# Patient Record
Sex: FEMALE | Race: BLACK OR AFRICAN AMERICAN | Employment: FULL TIME | URBAN - METROPOLITAN AREA
[De-identification: names, ages, dates, MRNs, and addresses within clinical notes are randomized per-mention and may not be internally consistent; named-entity substitution may affect disease eponyms.]

---

## 2017-08-10 ENCOUNTER — HOSPITAL ENCOUNTER (EMERGENCY)
Facility: HOSPITAL | Age: 27
Discharge: HOME | End: 2017-08-10
Attending: EMERGENCY MEDICINE

## 2017-08-10 VITALS
HEART RATE: 87 BPM | WEIGHT: 160 LBS | TEMPERATURE: 98 F | HEIGHT: 61 IN | BODY MASS INDEX: 30.21 KG/M2 | SYSTOLIC BLOOD PRESSURE: 110 MMHG | OXYGEN SATURATION: 98 % | DIASTOLIC BLOOD PRESSURE: 72 MMHG | RESPIRATION RATE: 17 BRPM

## 2017-08-10 DIAGNOSIS — J02.9 ACUTE VIRAL PHARYNGITIS: Primary | ICD-10-CM

## 2017-08-10 PROCEDURE — 87430 STREP A AG IA: CPT | Performed by: EMERGENCY MEDICINE

## 2017-08-10 PROCEDURE — 87081 CULTURE SCREEN ONLY: CPT | Performed by: EMERGENCY MEDICINE

## 2017-08-10 PROCEDURE — 99283 EMERGENCY DEPT VISIT LOW MDM: CPT

## 2017-08-10 NOTE — ED PROVIDER NOTES
Patient Seen in: [facility] Emergency Department    History   Patient presents with:  Sore Throat    Stated Complaint: hoarse    HPI    67-year-old female comes in the hospital to complaint of having difficulty with a sore throat.   It has been slowl 1117]  Pulse: 87 [08/10/17 1114]  Resp: 17 [08/10/17 1114]  Temp: 98.1 °F (36.7 °C) [08/10/17 1114]  Temp src: n/a  SpO2: 98 % [08/10/17 1114]  O2 Device: n/a    Current:/72   Pulse 87   Temp 98.1 °F (36.7 °C)   Resp 17   Ht 154.9 cm (5' 1\")   Wt 72

## 2017-08-11 ENCOUNTER — TELEPHONE (OUTPATIENT)
Dept: FAMILY MEDICINE CLINIC | Facility: CLINIC | Age: 27
End: 2017-08-11

## 2018-04-28 ENCOUNTER — APPOINTMENT (OUTPATIENT)
Dept: CT IMAGING | Facility: HOSPITAL | Age: 28
End: 2018-04-28
Attending: EMERGENCY MEDICINE

## 2018-04-28 ENCOUNTER — HOSPITAL ENCOUNTER (EMERGENCY)
Facility: HOSPITAL | Age: 28
Discharge: HOME | End: 2018-04-28
Attending: EMERGENCY MEDICINE

## 2018-04-28 VITALS
DIASTOLIC BLOOD PRESSURE: 60 MMHG | OXYGEN SATURATION: 100 % | HEIGHT: 62 IN | SYSTOLIC BLOOD PRESSURE: 96 MMHG | HEART RATE: 88 BPM | WEIGHT: 160 LBS | RESPIRATION RATE: 16 BRPM | BODY MASS INDEX: 29.44 KG/M2

## 2018-04-28 DIAGNOSIS — R51.9 HEADACHE IN FRONT OF HEAD: Primary | ICD-10-CM

## 2018-04-28 PROCEDURE — 96374 THER/PROPH/DIAG INJ IV PUSH: CPT

## 2018-04-28 PROCEDURE — 99284 EMERGENCY DEPT VISIT MOD MDM: CPT

## 2018-04-28 PROCEDURE — 81025 URINE PREGNANCY TEST: CPT

## 2018-04-28 PROCEDURE — 96375 TX/PRO/DX INJ NEW DRUG ADDON: CPT

## 2018-04-28 PROCEDURE — 85025 COMPLETE CBC W/AUTO DIFF WBC: CPT | Performed by: EMERGENCY MEDICINE

## 2018-04-28 PROCEDURE — 96361 HYDRATE IV INFUSION ADD-ON: CPT

## 2018-04-28 PROCEDURE — 70450 CT HEAD/BRAIN W/O DYE: CPT | Performed by: EMERGENCY MEDICINE

## 2018-04-28 PROCEDURE — 80048 BASIC METABOLIC PNL TOTAL CA: CPT | Performed by: EMERGENCY MEDICINE

## 2018-04-28 RX ORDER — LORAZEPAM 2 MG/ML
0.5 INJECTION INTRAMUSCULAR ONCE
Status: COMPLETED | OUTPATIENT
Start: 2018-04-28 | End: 2018-04-28

## 2018-04-28 RX ORDER — KETOROLAC TROMETHAMINE 30 MG/ML
30 INJECTION, SOLUTION INTRAMUSCULAR; INTRAVENOUS ONCE
Status: COMPLETED | OUTPATIENT
Start: 2018-04-28 | End: 2018-04-28

## 2018-04-28 RX ORDER — METOCLOPRAMIDE HYDROCHLORIDE 5 MG/ML
10 INJECTION INTRAMUSCULAR; INTRAVENOUS ONCE
Status: COMPLETED | OUTPATIENT
Start: 2018-04-28 | End: 2018-04-28

## 2018-04-28 RX ORDER — KETOROLAC TROMETHAMINE 10 MG/1
10 TABLET, FILM COATED ORAL EVERY 6 HOURS PRN
Qty: 20 TABLET | Refills: 0 | Status: SHIPPED | OUTPATIENT
Start: 2018-04-28 | End: 2018-05-05

## 2018-04-28 RX ORDER — METOCLOPRAMIDE 10 MG/1
10 TABLET ORAL 3 TIMES DAILY PRN
Qty: 20 TABLET | Refills: 0 | Status: SHIPPED | OUTPATIENT
Start: 2018-04-28 | End: 2018-05-28

## 2018-04-28 NOTE — ED PROVIDER NOTES
Patient Seen in: [facility] Emergency Department    History   Patient presents with:  Headache (neurologic)  Dyspnea SUJATHA SOB (respiratory)    Stated Complaint: Headache, SUJATHA    HPI    Patient complains of headache that started about 1 hour ago.   Tian Bhakta patient is awake, alert, conversant. Patient answers questions quickly and appropriately. She is hyperventilating and appears anxious. Face is symmetric. Speech is clear.   Eyes: sclera white, conjunctiva pink and moist midrange pupils, equal round reacti the individual orders.    POCT PREGNANCY, URINE   RAINBOW DRAW BLUE   RAINBOW DRAW LAVENDER   RAINBOW DRAW LIGHT GREEN   RAINBOW DRAW GOLD       ED Course as of Apr 28 1915  ------------------------------------------------------------       MDM   Patient's 0

## 2018-10-17 ENCOUNTER — APPOINTMENT (OUTPATIENT)
Dept: ULTRASOUND IMAGING | Facility: HOSPITAL | Age: 28
End: 2018-10-17
Attending: EMERGENCY MEDICINE

## 2018-10-17 ENCOUNTER — HOSPITAL ENCOUNTER (EMERGENCY)
Facility: HOSPITAL | Age: 28
Discharge: HOME | End: 2018-10-17
Attending: EMERGENCY MEDICINE

## 2018-10-17 VITALS
RESPIRATION RATE: 14 BRPM | DIASTOLIC BLOOD PRESSURE: 64 MMHG | OXYGEN SATURATION: 100 % | SYSTOLIC BLOOD PRESSURE: 101 MMHG | TEMPERATURE: 99 F | HEART RATE: 72 BPM

## 2018-10-17 DIAGNOSIS — B96.89 BV (BACTERIAL VAGINOSIS): Primary | ICD-10-CM

## 2018-10-17 DIAGNOSIS — N76.0 BV (BACTERIAL VAGINOSIS): Primary | ICD-10-CM

## 2018-10-17 PROCEDURE — 81003 URINALYSIS AUTO W/O SCOPE: CPT | Performed by: EMERGENCY MEDICINE

## 2018-10-17 PROCEDURE — 87491 CHLMYD TRACH DNA AMP PROBE: CPT | Performed by: NURSE PRACTITIONER

## 2018-10-17 PROCEDURE — 87591 N.GONORRHOEAE DNA AMP PROB: CPT | Performed by: NURSE PRACTITIONER

## 2018-10-17 PROCEDURE — 87510 GARDNER VAG DNA DIR PROBE: CPT | Performed by: NURSE PRACTITIONER

## 2018-10-17 PROCEDURE — 87660 TRICHOMONAS VAGIN DIR PROBE: CPT | Performed by: NURSE PRACTITIONER

## 2018-10-17 PROCEDURE — 87480 CANDIDA DNA DIR PROBE: CPT | Performed by: NURSE PRACTITIONER

## 2018-10-17 PROCEDURE — 81003 URINALYSIS AUTO W/O SCOPE: CPT

## 2018-10-17 PROCEDURE — 99285 EMERGENCY DEPT VISIT HI MDM: CPT

## 2018-10-17 PROCEDURE — 99284 EMERGENCY DEPT VISIT MOD MDM: CPT

## 2018-10-17 PROCEDURE — 76856 US EXAM PELVIC COMPLETE: CPT | Performed by: EMERGENCY MEDICINE

## 2018-10-17 PROCEDURE — 93975 VASCULAR STUDY: CPT | Performed by: EMERGENCY MEDICINE

## 2018-10-17 PROCEDURE — 81025 URINE PREGNANCY TEST: CPT

## 2018-10-17 PROCEDURE — 76830 TRANSVAGINAL US NON-OB: CPT | Performed by: EMERGENCY MEDICINE

## 2018-10-17 RX ORDER — METRONIDAZOLE 500 MG/1
500 TABLET ORAL 2 TIMES DAILY
Qty: 14 TABLET | Refills: 0 | Status: SHIPPED | OUTPATIENT
Start: 2018-10-17 | End: 2018-10-24

## 2018-10-17 RX ORDER — IBUPROFEN 600 MG/1
600 TABLET ORAL ONCE
Status: COMPLETED | OUTPATIENT
Start: 2018-10-17 | End: 2018-10-17

## 2018-10-17 NOTE — ED PROVIDER NOTES
Patient Seen in: [facility] Emergency Department    History   Patient presents with:  Abdomen/Flank Pain (GI/)    Stated Complaint: abd pain    HPI  Patient is a 60-year-old female who presents with 2-week history of intermittent left lower [place] Constitutional: She is oriented to person, place, and time. She appears well-developed and well-nourished. Non-toxic appearance. She does not appear ill. No distress.    HENT:   Mouth/Throat: Oropharynx is clear and moist.   Eyes: Conjunctivae are normal the pelvis was performed with a transvaginal and transabdominal probe. Doppler evaluation of the ovaries was performed of the ovarian arteries and veins. B-mode images, Doppler color flow, and spectral waveform analysis were performed.   Transvaginal ultr Dr. Bryce Lowery    Disposition and Plan     Clinical Impression:  BV (bacterial vaginosis)  (primary encounter diagnosis)    Disposition:  Discharge  10/17/2018  9:34 pm    Follow-up:  Alcira Bliss MD  [facility] [address] [facility] [address] [place] [id number]  [phone]

## 2019-03-24 ENCOUNTER — APPOINTMENT (OUTPATIENT)
Dept: ULTRASOUND IMAGING | Facility: HOSPITAL | Age: 29
End: 2019-03-24
Attending: EMERGENCY MEDICINE
Payer: MEDICAID

## 2019-03-24 ENCOUNTER — HOSPITAL ENCOUNTER (EMERGENCY)
Facility: HOSPITAL | Age: 29
Discharge: HOME | End: 2019-03-24
Attending: EMERGENCY MEDICINE
Payer: MEDICAID

## 2019-03-24 VITALS
TEMPERATURE: 99 F | DIASTOLIC BLOOD PRESSURE: 54 MMHG | BODY MASS INDEX: 29.06 KG/M2 | RESPIRATION RATE: 16 BRPM | WEIGHT: 148 LBS | HEIGHT: 60 IN | SYSTOLIC BLOOD PRESSURE: 88 MMHG | OXYGEN SATURATION: 100 % | HEART RATE: 86 BPM

## 2019-03-24 DIAGNOSIS — Z34.91 FIRST TRIMESTER PREGNANCY: Primary | ICD-10-CM

## 2019-03-24 DIAGNOSIS — B96.89 BACTERIAL VAGINOSIS: ICD-10-CM

## 2019-03-24 DIAGNOSIS — N76.0 BACTERIAL VAGINOSIS: ICD-10-CM

## 2019-03-24 LAB
ALBUMIN SERPL-MCNC: 3.3 G/DL (ref 3.4–5)
ALBUMIN/GLOB SERPL: 0.9 {RATIO} (ref 1–2)
ALP LIVER SERPL-CCNC: 58 U/L (ref 37–98)
ALT SERPL-CCNC: 15 U/L (ref 13–56)
ANION GAP SERPL CALC-SCNC: 6 MMOL/L (ref 0–18)
AST SERPL-CCNC: 13 U/L (ref 15–37)
B-HCG SERPL-ACNC: ABNORMAL MIU/ML
BASOPHILS # BLD AUTO: 0.04 X10(3) UL (ref 0–0.2)
BASOPHILS NFR BLD AUTO: 0.6 %
BILIRUB SERPL-MCNC: 0.3 MG/DL (ref 0.1–2)
BILIRUB UR QL STRIP.AUTO: NEGATIVE
BUN BLD-MCNC: 7 MG/DL (ref 7–18)
BUN/CREAT SERPL: 8.4 (ref 10–20)
CALCIUM BLD-MCNC: 8.1 MG/DL (ref 8.5–10.1)
CHLORIDE SERPL-SCNC: 108 MMOL/L (ref 98–107)
CLARITY UR REFRACT.AUTO: CLEAR
CO2 SERPL-SCNC: 23 MMOL/L (ref 21–32)
COLOR UR AUTO: YELLOW
CREAT BLD-MCNC: 0.83 MG/DL (ref 0.55–1.02)
DEPRECATED RDW RBC AUTO: 45.4 FL (ref 35.1–46.3)
EOSINOPHIL # BLD AUTO: 0.1 X10(3) UL (ref 0–0.7)
EOSINOPHIL NFR BLD AUTO: 1.5 %
ERYTHROCYTE [DISTWIDTH] IN BLOOD BY AUTOMATED COUNT: 13.2 % (ref 11–15)
GLOBULIN PLAS-MCNC: 3.6 G/DL (ref 2.8–4.4)
GLUCOSE BLD-MCNC: 102 MG/DL (ref 70–99)
GLUCOSE UR STRIP.AUTO-MCNC: NEGATIVE MG/DL
HCT VFR BLD AUTO: 39.2 % (ref 35–48)
HGB BLD-MCNC: 13.4 G/DL (ref 12–16)
IMM GRANULOCYTES # BLD AUTO: 0.02 X10(3) UL (ref 0–1)
IMM GRANULOCYTES NFR BLD: 0.3 %
KETONES UR STRIP.AUTO-MCNC: NEGATIVE MG/DL
LEUKOCYTE ESTERASE UR QL STRIP.AUTO: NEGATIVE
LIPASE SERPL-CCNC: 135 U/L (ref 73–393)
LYMPHOCYTES # BLD AUTO: 1.33 X10(3) UL (ref 1–4)
LYMPHOCYTES NFR BLD AUTO: 20.6 %
M PROTEIN MFR SERPL ELPH: 6.9 G/DL (ref 6.4–8.2)
MCH RBC QN AUTO: 32.1 PG (ref 26–34)
MCHC RBC AUTO-ENTMCNC: 34.2 G/DL (ref 31–37)
MCV RBC AUTO: 94 FL (ref 80–100)
MONOCYTES # BLD AUTO: 0.37 X10(3) UL (ref 0.1–1)
MONOCYTES NFR BLD AUTO: 5.7 %
NEUTROPHILS # BLD AUTO: 4.61 X10 (3) UL (ref 1.5–7.7)
NEUTROPHILS # BLD AUTO: 4.61 X10(3) UL (ref 1.5–7.7)
NEUTROPHILS NFR BLD AUTO: 71.3 %
NITRITE UR QL STRIP.AUTO: NEGATIVE
OSMOLALITY SERPL CALC.SUM OF ELEC: 282 MOSM/KG (ref 275–295)
PH UR STRIP.AUTO: 6 [PH] (ref 4.5–8)
PLATELET # BLD AUTO: 220 10(3)UL (ref 150–450)
POCT LOT NUMBER: NORMAL
POCT URINE PREGNANCY: POSITIVE
POTASSIUM SERPL-SCNC: 3.6 MMOL/L (ref 3.5–5.1)
PROT UR STRIP.AUTO-MCNC: NEGATIVE MG/DL
RBC # BLD AUTO: 4.17 X10(6)UL (ref 3.8–5.3)
RBC UR QL AUTO: NEGATIVE
SODIUM SERPL-SCNC: 137 MMOL/L (ref 136–145)
SP GR UR STRIP.AUTO: 1.01 (ref 1–1.03)
UROBILINOGEN UR STRIP.AUTO-MCNC: <2 MG/DL
WBC # BLD AUTO: 6.5 X10(3) UL (ref 4–11)

## 2019-03-24 PROCEDURE — 87491 CHLMYD TRACH DNA AMP PROBE: CPT | Performed by: EMERGENCY MEDICINE

## 2019-03-24 PROCEDURE — 96361 HYDRATE IV INFUSION ADD-ON: CPT

## 2019-03-24 PROCEDURE — 76817 TRANSVAGINAL US OBSTETRIC: CPT | Performed by: EMERGENCY MEDICINE

## 2019-03-24 PROCEDURE — 84702 CHORIONIC GONADOTROPIN TEST: CPT | Performed by: EMERGENCY MEDICINE

## 2019-03-24 PROCEDURE — 87480 CANDIDA DNA DIR PROBE: CPT | Performed by: EMERGENCY MEDICINE

## 2019-03-24 PROCEDURE — 87591 N.GONORRHOEAE DNA AMP PROB: CPT | Performed by: EMERGENCY MEDICINE

## 2019-03-24 PROCEDURE — 85025 COMPLETE CBC W/AUTO DIFF WBC: CPT | Performed by: EMERGENCY MEDICINE

## 2019-03-24 PROCEDURE — 99285 EMERGENCY DEPT VISIT HI MDM: CPT

## 2019-03-24 PROCEDURE — 87510 GARDNER VAG DNA DIR PROBE: CPT | Performed by: EMERGENCY MEDICINE

## 2019-03-24 PROCEDURE — 81025 URINE PREGNANCY TEST: CPT

## 2019-03-24 PROCEDURE — 80053 COMPREHEN METABOLIC PANEL: CPT | Performed by: EMERGENCY MEDICINE

## 2019-03-24 PROCEDURE — 83690 ASSAY OF LIPASE: CPT | Performed by: EMERGENCY MEDICINE

## 2019-03-24 PROCEDURE — 87660 TRICHOMONAS VAGIN DIR PROBE: CPT | Performed by: EMERGENCY MEDICINE

## 2019-03-24 PROCEDURE — 96360 HYDRATION IV INFUSION INIT: CPT

## 2019-03-24 PROCEDURE — 99284 EMERGENCY DEPT VISIT MOD MDM: CPT

## 2019-03-24 PROCEDURE — 81003 URINALYSIS AUTO W/O SCOPE: CPT | Performed by: EMERGENCY MEDICINE

## 2019-03-24 PROCEDURE — 76801 OB US < 14 WKS SINGLE FETUS: CPT | Performed by: EMERGENCY MEDICINE

## 2019-03-24 RX ORDER — METRONIDAZOLE 500 MG/1
500 TABLET ORAL 2 TIMES DAILY
Qty: 14 TABLET | Refills: 0 | Status: SHIPPED | OUTPATIENT
Start: 2019-03-24 | End: 2019-03-31

## 2019-03-24 NOTE — ED NOTES
Urine obtained and sent, ucg positive, md aware, awaiting bladder to be full for us, no chnages, no distress noted

## 2019-03-24 NOTE — ED INITIAL ASSESSMENT (HPI)
Pt presents with bilat lower abdominal pain starting on the r 2 days ago, pt c/o vaginal discharge and urinary urgency, pt concerned she may be pregnant

## 2019-03-24 NOTE — ED PROVIDER NOTES
Patient Seen in: [facility] Emergency Department    History   Patient presents with:  Abdomen/Flank Pain (GI/)    Stated Complaint: abdominal pain x 2 days    HPI    Patient is a 79-year-old female, presenting for evaluation of pelvic pain.     Sym HEART: Regular rate and rhythm. ABDOMEN: The abdomen is soft, nondistended, nontender. Bowel sounds present. SKIN: Skin is warm and dry. There are no rashes. EXTREMITIES: The patient moves all 4 extremities freely. No cyanosis, clubbing, or edema. Us Ob W/ Ev 1st Trimester (NTZ=31800/22431)    Result Date: 3/24/2019  PROCEDURE:  US OB W/ EV 1ST TRIMESTER (CPT=76801/76328)  COMPARISON:  None.   INDICATIONS:  eval pregnancy  TECHNIQUE:  Transabdominal and endovaginal pelvic ultrasound examinations we Outpatient follow-up emphasized. Additional evaluation and intervention may be required, depending on the patient's clinical course. Patient should arrange follow-up as soon as possible.   Patient was invited to return for any problems, worsening symp

## 2019-03-25 LAB
C TRACH DNA SPEC QL NAA+PROBE: NEGATIVE
N GONORRHOEA DNA SPEC QL NAA+PROBE: NEGATIVE

## 2019-06-18 ENCOUNTER — HOSPITAL ENCOUNTER (EMERGENCY)
Facility: HOSPITAL | Age: 29
Discharge: HOME | End: 2019-06-18
Attending: EMERGENCY MEDICINE
Payer: MEDICAID

## 2019-06-18 ENCOUNTER — APPOINTMENT (OUTPATIENT)
Dept: CT IMAGING | Facility: HOSPITAL | Age: 29
End: 2019-06-18
Attending: EMERGENCY MEDICINE
Payer: MEDICAID

## 2019-06-18 VITALS
HEART RATE: 78 BPM | BODY MASS INDEX: 27.38 KG/M2 | RESPIRATION RATE: 16 BRPM | HEIGHT: 61 IN | OXYGEN SATURATION: 100 % | SYSTOLIC BLOOD PRESSURE: 100 MMHG | DIASTOLIC BLOOD PRESSURE: 61 MMHG | WEIGHT: 145 LBS | TEMPERATURE: 99 F

## 2019-06-18 DIAGNOSIS — B96.89 BACTERIAL VAGINOSIS: ICD-10-CM

## 2019-06-18 DIAGNOSIS — N20.1 URETEROLITHIASIS: Primary | ICD-10-CM

## 2019-06-18 DIAGNOSIS — E87.6 HYPOKALEMIA: ICD-10-CM

## 2019-06-18 DIAGNOSIS — N76.0 BACTERIAL VAGINOSIS: ICD-10-CM

## 2019-06-18 PROCEDURE — 87491 CHLMYD TRACH DNA AMP PROBE: CPT | Performed by: EMERGENCY MEDICINE

## 2019-06-18 PROCEDURE — 81025 URINE PREGNANCY TEST: CPT

## 2019-06-18 PROCEDURE — 85025 COMPLETE CBC W/AUTO DIFF WBC: CPT | Performed by: EMERGENCY MEDICINE

## 2019-06-18 PROCEDURE — 99284 EMERGENCY DEPT VISIT MOD MDM: CPT

## 2019-06-18 PROCEDURE — 87086 URINE CULTURE/COLONY COUNT: CPT | Performed by: EMERGENCY MEDICINE

## 2019-06-18 PROCEDURE — 87510 GARDNER VAG DNA DIR PROBE: CPT | Performed by: EMERGENCY MEDICINE

## 2019-06-18 PROCEDURE — 87591 N.GONORRHOEAE DNA AMP PROB: CPT | Performed by: EMERGENCY MEDICINE

## 2019-06-18 PROCEDURE — 83690 ASSAY OF LIPASE: CPT | Performed by: EMERGENCY MEDICINE

## 2019-06-18 PROCEDURE — 96374 THER/PROPH/DIAG INJ IV PUSH: CPT

## 2019-06-18 PROCEDURE — 74176 CT ABD & PELVIS W/O CONTRAST: CPT | Performed by: EMERGENCY MEDICINE

## 2019-06-18 PROCEDURE — 80053 COMPREHEN METABOLIC PANEL: CPT | Performed by: EMERGENCY MEDICINE

## 2019-06-18 PROCEDURE — 87660 TRICHOMONAS VAGIN DIR PROBE: CPT | Performed by: EMERGENCY MEDICINE

## 2019-06-18 PROCEDURE — 81001 URINALYSIS AUTO W/SCOPE: CPT | Performed by: EMERGENCY MEDICINE

## 2019-06-18 PROCEDURE — 87480 CANDIDA DNA DIR PROBE: CPT | Performed by: EMERGENCY MEDICINE

## 2019-06-18 RX ORDER — CLINDAMYCIN HYDROCHLORIDE 300 MG/1
300 CAPSULE ORAL 2 TIMES DAILY
Qty: 14 CAPSULE | Refills: 0 | Status: SHIPPED | OUTPATIENT
Start: 2019-06-18 | End: 2019-06-25

## 2019-06-18 RX ORDER — KETOROLAC TROMETHAMINE 30 MG/ML
30 INJECTION, SOLUTION INTRAMUSCULAR; INTRAVENOUS ONCE
Status: COMPLETED | OUTPATIENT
Start: 2019-06-18 | End: 2019-06-18

## 2019-06-18 RX ORDER — POTASSIUM CHLORIDE 20 MEQ/1
60 TABLET, EXTENDED RELEASE ORAL ONCE
Status: COMPLETED | OUTPATIENT
Start: 2019-06-18 | End: 2019-06-18

## 2019-06-18 RX ORDER — HYDROCODONE BITARTRATE AND ACETAMINOPHEN 5; 325 MG/1; MG/1
1-2 TABLET ORAL EVERY 6 HOURS PRN
Qty: 10 TABLET | Refills: 0 | Status: SHIPPED | OUTPATIENT
Start: 2019-06-18 | End: 2019-06-25

## 2019-06-18 RX ORDER — ONDANSETRON 4 MG/1
4 TABLET, ORALLY DISINTEGRATING ORAL EVERY 4 HOURS PRN
Qty: 10 TABLET | Refills: 0 | Status: SHIPPED | OUTPATIENT
Start: 2019-06-18 | End: 2019-06-25

## 2019-06-18 NOTE — ED INITIAL ASSESSMENT (HPI)
Pt here with L hip area pain, burning pain that radiates around to L groin area. Pain started yesterday. States she had  in April and also had IUD placed at that time and has not had follow up with gyn yet.   She is not sure if this is related to

## 2019-06-18 NOTE — ED PROVIDER NOTES
Patient Seen in: [facility] Emergency Department    History   Patient presents with:  Abdomen/Flank Pain (GI/)    Stated Complaint: left flank pain since yesterday. Had  one month ago. HPI    Patient presents with left side pain.   The 100%   BMI 27.40 kg/m²         Physical Exam    General: Alert and oriented x3 in no acute distress. HEENT: Normocephalic, atraumatic, pupils equal round and reactive to light, oropharynx clear. Neck: Supple.   Cardiovascular: Regular rate and rhythm, no ------                     CBC W/ DIFFERENTIAL[[id number]]          Abnormal            Final result                 Please view results for these tests on the individual orders.    POCT PREGNANCY, URINE   RAINBOW DRAW BLUE   RAINBOW DRAW LAVENDER   RAINBOW Negative. CONCLUSION:   1. There is a 4 mm calculus in the mid left ureter causing mild obstruction. 2. Marked medullary nephrocalcinosis is noted. 3. IUD is low lying in the lower uterine segment. This does not reach the gastric fundus.     Dictat mouth 2 (two) times daily for 7 days. , Print Script, Disp-14 capsule, R-0

## 2019-06-19 PROBLEM — N29 NEPHROCALCINOSIS: Status: ACTIVE | Noted: 2019-06-19

## 2019-06-19 PROBLEM — E83.59 NEPHROCALCINOSIS: Status: ACTIVE | Noted: 2019-06-19

## 2019-06-19 PROBLEM — Q61.5 MEDULLARY SPONGE KIDNEY OF BOTH KIDNEYS: Status: ACTIVE | Noted: 2019-06-19

## 2019-06-19 PROBLEM — R10.9 LEFT FLANK PAIN: Status: ACTIVE | Noted: 2019-06-19

## 2019-06-19 PROBLEM — N20.1 LEFT URETERAL STONE: Status: ACTIVE | Noted: 2019-06-19

## 2019-06-21 ENCOUNTER — APPOINTMENT (OUTPATIENT)
Dept: GENERAL RADIOLOGY | Facility: HOSPITAL | Age: 29
End: 2019-06-21
Attending: EMERGENCY MEDICINE
Payer: MEDICAID

## 2019-06-21 ENCOUNTER — HOSPITAL ENCOUNTER (EMERGENCY)
Facility: HOSPITAL | Age: 29
Discharge: HOME | End: 2019-06-21
Attending: EMERGENCY MEDICINE
Payer: MEDICAID

## 2019-06-21 VITALS
OXYGEN SATURATION: 100 % | RESPIRATION RATE: 20 BRPM | TEMPERATURE: 98 F | SYSTOLIC BLOOD PRESSURE: 101 MMHG | WEIGHT: 145 LBS | HEART RATE: 68 BPM | HEIGHT: 61 IN | BODY MASS INDEX: 27.38 KG/M2 | DIASTOLIC BLOOD PRESSURE: 64 MMHG

## 2019-06-21 DIAGNOSIS — N39.0 URINARY TRACT INFECTION WITHOUT HEMATURIA, SITE UNSPECIFIED: ICD-10-CM

## 2019-06-21 DIAGNOSIS — N20.0 KIDNEY STONE: Primary | ICD-10-CM

## 2019-06-21 PROCEDURE — 74018 RADEX ABDOMEN 1 VIEW: CPT | Performed by: EMERGENCY MEDICINE

## 2019-06-21 PROCEDURE — 99284 EMERGENCY DEPT VISIT MOD MDM: CPT

## 2019-06-21 PROCEDURE — 36415 COLL VENOUS BLD VENIPUNCTURE: CPT

## 2019-06-21 PROCEDURE — 87086 URINE CULTURE/COLONY COUNT: CPT | Performed by: EMERGENCY MEDICINE

## 2019-06-21 PROCEDURE — 80048 BASIC METABOLIC PNL TOTAL CA: CPT | Performed by: EMERGENCY MEDICINE

## 2019-06-21 PROCEDURE — 85025 COMPLETE CBC W/AUTO DIFF WBC: CPT | Performed by: EMERGENCY MEDICINE

## 2019-06-21 PROCEDURE — 81001 URINALYSIS AUTO W/SCOPE: CPT | Performed by: EMERGENCY MEDICINE

## 2019-06-21 RX ORDER — SULFAMETHOXAZOLE AND TRIMETHOPRIM 800; 160 MG/1; MG/1
1 TABLET ORAL 2 TIMES DAILY
Qty: 14 TABLET | Refills: 0 | Status: SHIPPED | OUTPATIENT
Start: 2019-06-21 | End: 2019-06-28

## 2019-06-21 NOTE — ED PROVIDER NOTES
Patient Seen in: [facility] Emergency Department    History   Patient presents with:  Abdomen/Flank Pain (GI/)    Stated Complaint: recent dx of kidney stone- continued pain    HPI    16-year-old woman who was diagnosed with a kidney stone 3 days URINALYSIS WITH CULTURE REFLEX - Abnormal; Notable for the following components:       Result Value    Leukocyte Esterase Urine Trace (*)     WBC Urine 11-20 (*)     RBC URINE 3-5 (*)     Bacteria Urine Rare (*)     Squamous Epi.  Cells Moderate (*)     M unspecified    Disposition:  Discharge  6/21/2019 11:01 am    Follow-up:  Romelia Hartman MD  [facility] 24, [address]  [phone]    In 3 days          Medications Prescribed:  Discharge Medication List as of 6/21/2019 11:0

## 2019-06-21 NOTE — ED INITIAL ASSESSMENT (HPI)
Pt presented to ED c/o worsening left side flank pain. Pt was here Tuesday and was diagnosed with Kidney stones. Pt was told to return if pain worsened prompting pt visit today.

## 2019-07-12 ENCOUNTER — APPOINTMENT (OUTPATIENT)
Dept: ULTRASOUND IMAGING | Facility: HOSPITAL | Age: 29
End: 2019-07-12
Attending: STUDENT IN AN ORGANIZED HEALTH CARE EDUCATION/TRAINING PROGRAM
Payer: MEDICAID

## 2019-07-12 ENCOUNTER — HOSPITAL ENCOUNTER (EMERGENCY)
Facility: HOSPITAL | Age: 29
Discharge: HOME | End: 2019-07-12
Attending: STUDENT IN AN ORGANIZED HEALTH CARE EDUCATION/TRAINING PROGRAM
Payer: MEDICAID

## 2019-07-12 VITALS
HEART RATE: 68 BPM | DIASTOLIC BLOOD PRESSURE: 52 MMHG | BODY MASS INDEX: 27.38 KG/M2 | TEMPERATURE: 99 F | WEIGHT: 145 LBS | RESPIRATION RATE: 16 BRPM | HEIGHT: 61 IN | SYSTOLIC BLOOD PRESSURE: 100 MMHG | OXYGEN SATURATION: 98 %

## 2019-07-12 DIAGNOSIS — N39.0 URINARY TRACT INFECTION WITHOUT HEMATURIA, SITE UNSPECIFIED: ICD-10-CM

## 2019-07-12 DIAGNOSIS — N23 RENAL COLIC: Primary | ICD-10-CM

## 2019-07-12 LAB
ALBUMIN SERPL-MCNC: 3.4 G/DL (ref 3.4–5)
ALBUMIN/GLOB SERPL: 0.9 {RATIO} (ref 1–2)
ALP LIVER SERPL-CCNC: 68 U/L (ref 37–98)
ALT SERPL-CCNC: 23 U/L (ref 13–56)
ANION GAP SERPL CALC-SCNC: 5 MMOL/L (ref 0–18)
AST SERPL-CCNC: 13 U/L (ref 15–37)
BASOPHILS # BLD AUTO: 0.04 X10(3) UL (ref 0–0.2)
BASOPHILS NFR BLD AUTO: 0.6 %
BILIRUB SERPL-MCNC: 0.3 MG/DL (ref 0.1–2)
BILIRUB UR QL STRIP.AUTO: NEGATIVE
BUN BLD-MCNC: 6 MG/DL (ref 7–18)
BUN/CREAT SERPL: 7.7 (ref 10–20)
CALCIUM BLD-MCNC: 8.7 MG/DL (ref 8.5–10.1)
CHLORIDE SERPL-SCNC: 108 MMOL/L (ref 98–112)
CO2 SERPL-SCNC: 26 MMOL/L (ref 21–32)
COLOR UR AUTO: YELLOW
CREAT BLD-MCNC: 0.78 MG/DL (ref 0.55–1.02)
DEPRECATED RDW RBC AUTO: 44.5 FL (ref 35.1–46.3)
EOSINOPHIL # BLD AUTO: 0.11 X10(3) UL (ref 0–0.7)
EOSINOPHIL NFR BLD AUTO: 1.7 %
ERYTHROCYTE [DISTWIDTH] IN BLOOD BY AUTOMATED COUNT: 13 % (ref 11–15)
GLOBULIN PLAS-MCNC: 4 G/DL (ref 2.8–4.4)
GLUCOSE BLD-MCNC: 88 MG/DL (ref 70–99)
GLUCOSE UR STRIP.AUTO-MCNC: NEGATIVE MG/DL
HCT VFR BLD AUTO: 38.3 % (ref 35–48)
HGB BLD-MCNC: 12.8 G/DL (ref 12–16)
IMM GRANULOCYTES # BLD AUTO: 0.01 X10(3) UL (ref 0–1)
IMM GRANULOCYTES NFR BLD: 0.2 %
KETONES UR STRIP.AUTO-MCNC: NEGATIVE MG/DL
LYMPHOCYTES # BLD AUTO: 2.11 X10(3) UL (ref 1–4)
LYMPHOCYTES NFR BLD AUTO: 32.5 %
M PROTEIN MFR SERPL ELPH: 7.4 G/DL (ref 6.4–8.2)
MCH RBC QN AUTO: 31.3 PG (ref 26–34)
MCHC RBC AUTO-ENTMCNC: 33.4 G/DL (ref 31–37)
MCV RBC AUTO: 93.6 FL (ref 80–100)
MONOCYTES # BLD AUTO: 0.47 X10(3) UL (ref 0.1–1)
MONOCYTES NFR BLD AUTO: 7.2 %
NEUTROPHILS # BLD AUTO: 3.76 X10 (3) UL (ref 1.5–7.7)
NEUTROPHILS # BLD AUTO: 3.76 X10(3) UL (ref 1.5–7.7)
NEUTROPHILS NFR BLD AUTO: 57.8 %
NITRITE UR QL STRIP.AUTO: NEGATIVE
OSMOLALITY SERPL CALC.SUM OF ELEC: 285 MOSM/KG (ref 275–295)
PH UR STRIP.AUTO: 6 [PH] (ref 4.5–8)
PLATELET # BLD AUTO: 280 10(3)UL (ref 150–450)
POCT LOT NUMBER: NORMAL
POCT URINE PREGNANCY: NEGATIVE
POTASSIUM SERPL-SCNC: 3.9 MMOL/L (ref 3.5–5.1)
PROT UR STRIP.AUTO-MCNC: NEGATIVE MG/DL
RBC # BLD AUTO: 4.09 X10(6)UL (ref 3.8–5.3)
SODIUM SERPL-SCNC: 139 MMOL/L (ref 136–145)
SP GR UR STRIP.AUTO: 1.01 (ref 1–1.03)
UROBILINOGEN UR STRIP.AUTO-MCNC: <2 MG/DL
WBC # BLD AUTO: 6.5 X10(3) UL (ref 4–11)
WBC #/AREA URNS AUTO: >50 /HPF

## 2019-07-12 PROCEDURE — 96365 THER/PROPH/DIAG IV INF INIT: CPT | Performed by: STUDENT IN AN ORGANIZED HEALTH CARE EDUCATION/TRAINING PROGRAM

## 2019-07-12 PROCEDURE — 87186 SC STD MICRODIL/AGAR DIL: CPT | Performed by: STUDENT IN AN ORGANIZED HEALTH CARE EDUCATION/TRAINING PROGRAM

## 2019-07-12 PROCEDURE — 96375 TX/PRO/DX INJ NEW DRUG ADDON: CPT | Performed by: STUDENT IN AN ORGANIZED HEALTH CARE EDUCATION/TRAINING PROGRAM

## 2019-07-12 PROCEDURE — 87088 URINE BACTERIA CULTURE: CPT | Performed by: STUDENT IN AN ORGANIZED HEALTH CARE EDUCATION/TRAINING PROGRAM

## 2019-07-12 PROCEDURE — 85025 COMPLETE CBC W/AUTO DIFF WBC: CPT | Performed by: STUDENT IN AN ORGANIZED HEALTH CARE EDUCATION/TRAINING PROGRAM

## 2019-07-12 PROCEDURE — 76770 US EXAM ABDO BACK WALL COMP: CPT | Performed by: STUDENT IN AN ORGANIZED HEALTH CARE EDUCATION/TRAINING PROGRAM

## 2019-07-12 PROCEDURE — 81001 URINALYSIS AUTO W/SCOPE: CPT | Performed by: STUDENT IN AN ORGANIZED HEALTH CARE EDUCATION/TRAINING PROGRAM

## 2019-07-12 PROCEDURE — 96361 HYDRATE IV INFUSION ADD-ON: CPT | Performed by: STUDENT IN AN ORGANIZED HEALTH CARE EDUCATION/TRAINING PROGRAM

## 2019-07-12 PROCEDURE — 99284 EMERGENCY DEPT VISIT MOD MDM: CPT | Performed by: STUDENT IN AN ORGANIZED HEALTH CARE EDUCATION/TRAINING PROGRAM

## 2019-07-12 PROCEDURE — 87086 URINE CULTURE/COLONY COUNT: CPT | Performed by: STUDENT IN AN ORGANIZED HEALTH CARE EDUCATION/TRAINING PROGRAM

## 2019-07-12 PROCEDURE — 80053 COMPREHEN METABOLIC PANEL: CPT | Performed by: STUDENT IN AN ORGANIZED HEALTH CARE EDUCATION/TRAINING PROGRAM

## 2019-07-12 PROCEDURE — 81025 URINE PREGNANCY TEST: CPT | Performed by: STUDENT IN AN ORGANIZED HEALTH CARE EDUCATION/TRAINING PROGRAM

## 2019-07-12 RX ORDER — KETOROLAC TROMETHAMINE 30 MG/ML
15 INJECTION, SOLUTION INTRAMUSCULAR; INTRAVENOUS ONCE
Status: COMPLETED | OUTPATIENT
Start: 2019-07-12 | End: 2019-07-12

## 2019-07-12 RX ORDER — CEPHALEXIN 500 MG/1
500 CAPSULE ORAL 4 TIMES DAILY
Qty: 28 CAPSULE | Refills: 0 | Status: SHIPPED | OUTPATIENT
Start: 2019-07-12 | End: 2019-07-19

## 2019-07-12 RX ORDER — KETOROLAC TROMETHAMINE 10 MG/1
10 TABLET, FILM COATED ORAL EVERY 6 HOURS PRN
Qty: 15 TABLET | Refills: 0 | Status: SHIPPED | OUTPATIENT
Start: 2019-07-12 | End: 2019-07-19

## 2019-07-12 NOTE — ED PROVIDER NOTES
Patient Seen in: [facility] Emergency Department    History   Patient presents with:  Urinary Symptoms (urologic)    Stated Complaint:     HPI    Patient is a 31-year-old female who presents the emergency department reporting left-sided flank pain. light.   Neck: Normal range of motion. Neck supple. Cardiovascular: Normal rate, regular rhythm, normal heart sounds and intact distal pulses. Exam reveals no gallop and no friction rub. No murmur heard.   Pulmonary/Chest: Effort normal. No respirator more than 50 white blood cells in the patient's urine with moderate leukocyte esterase, small blood, 3-5 red cells and 2+ bacteria. This is likely related to urinary tract infection. Patient treated with a dose of Rocephin.   Patient's blood work reveals [facility].

## 2019-07-12 NOTE — ED INITIAL ASSESSMENT (HPI)
Pt here with L flank pain and urinary urgency, pt was dx'd with kidney stone on the L side but did not follow up with Urology due to work schedule.

## 2019-07-15 ENCOUNTER — OFFICE VISIT (OUTPATIENT)
Dept: FAMILY MEDICINE CLINIC | Facility: CLINIC | Age: 29
End: 2019-07-15
Payer: MEDICAID

## 2019-07-15 VITALS
BODY MASS INDEX: 27.94 KG/M2 | HEART RATE: 98 BPM | OXYGEN SATURATION: 99 % | HEIGHT: 61 IN | SYSTOLIC BLOOD PRESSURE: 118 MMHG | RESPIRATION RATE: 16 BRPM | DIASTOLIC BLOOD PRESSURE: 60 MMHG | WEIGHT: 148 LBS | TEMPERATURE: 99 F

## 2019-07-15 DIAGNOSIS — T36.95XA ANTIBIOTIC-INDUCED YEAST INFECTION: Primary | ICD-10-CM

## 2019-07-15 DIAGNOSIS — B37.9 ANTIBIOTIC-INDUCED YEAST INFECTION: Primary | ICD-10-CM

## 2019-07-15 PROCEDURE — 99202 OFFICE O/P NEW SF 15 MIN: CPT | Performed by: NURSE PRACTITIONER

## 2019-07-15 RX ORDER — FLUCONAZOLE 150 MG/1
150 TABLET ORAL ONCE
Qty: 1 TABLET | Refills: 1 | Status: SHIPPED | OUTPATIENT
Start: 2019-07-15 | End: 2019-07-15

## 2019-07-15 NOTE — PATIENT INSTRUCTIONS
Yeast Infection (Candida Vaginal Infection)    You have a Candida vaginal infection. This is also known as a yeast infection. It is most often caused by a type of yeast (fungus) called Candida. Candida are normally found in the vagina.  But if they increa © 2085-9096 [employer] [address]. [address] [facility], [address] [place]. All rights reserved. This information is not intended as a substitute for professional medical care. Always follow your healthcare professional's instructions.         Vaginal · Trichomoniasis (“trich”). Jaron Melendez is a parasite. It is passed from one person to another during sex. Men with trich often don’t have any symptoms. In women, it can take weeks or months before symptoms appear.   Date Last Reviewed: 3/1/2017  © 1528-1673 The

## 2019-07-15 NOTE — PROGRESS NOTES
CHIEF COMPLAINT:   No chief complaint on file. HPI:    Nisreen Quinteros is a 30year old female who presents for evaluation of a yeast infection. Pt states it started 1 day ago and is becoming worse.  Pt states she has a lot of vaginal itching and b of throat. CARDIOVASCULAR: Denies chest pains or palpitations. LUNGS: Denies shortness of breath with exertion or rest. No cough or wheezing. LYMPH: Denies enlargement of the lymph nodes.   NEURO: Denies abnormal sensation, tingling of the skin, or numbn

## 2019-07-23 ENCOUNTER — APPOINTMENT (OUTPATIENT)
Dept: GENERAL RADIOLOGY | Facility: HOSPITAL | Age: 29
End: 2019-07-23
Attending: EMERGENCY MEDICINE
Payer: MEDICAID

## 2019-07-23 ENCOUNTER — HOSPITAL ENCOUNTER (EMERGENCY)
Facility: HOSPITAL | Age: 29
Discharge: HOME | End: 2019-07-23
Attending: EMERGENCY MEDICINE
Payer: MEDICAID

## 2019-07-23 VITALS
RESPIRATION RATE: 14 BRPM | HEIGHT: 61 IN | SYSTOLIC BLOOD PRESSURE: 96 MMHG | DIASTOLIC BLOOD PRESSURE: 57 MMHG | HEART RATE: 94 BPM | BODY MASS INDEX: 27.38 KG/M2 | TEMPERATURE: 98 F | WEIGHT: 145 LBS | OXYGEN SATURATION: 99 %

## 2019-07-23 DIAGNOSIS — R20.2 PARESTHESIAS: Primary | ICD-10-CM

## 2019-07-23 LAB
ALBUMIN SERPL-MCNC: 3.7 G/DL (ref 3.4–5)
ALBUMIN/GLOB SERPL: 0.8 {RATIO} (ref 1–2)
ALP LIVER SERPL-CCNC: 77 U/L (ref 37–98)
ALT SERPL-CCNC: 29 U/L (ref 13–56)
ANION GAP SERPL CALC-SCNC: 5 MMOL/L (ref 0–18)
AST SERPL-CCNC: 26 U/L (ref 15–37)
BASOPHILS # BLD AUTO: 0.04 X10(3) UL (ref 0–0.2)
BASOPHILS NFR BLD AUTO: 0.4 %
BILIRUB SERPL-MCNC: 0.5 MG/DL (ref 0.1–2)
BILIRUB UR QL STRIP.AUTO: NEGATIVE
BUN BLD-MCNC: 9 MG/DL (ref 7–18)
BUN/CREAT SERPL: 10.8 (ref 10–20)
CALCIUM BLD-MCNC: 9.1 MG/DL (ref 8.5–10.1)
CHLORIDE SERPL-SCNC: 106 MMOL/L (ref 98–112)
CLARITY UR REFRACT.AUTO: CLEAR
CO2 SERPL-SCNC: 24 MMOL/L (ref 21–32)
COLOR UR AUTO: YELLOW
CREAT BLD-MCNC: 0.83 MG/DL (ref 0.55–1.02)
DEPRECATED RDW RBC AUTO: 45.8 FL (ref 35.1–46.3)
EOSINOPHIL # BLD AUTO: 0.02 X10(3) UL (ref 0–0.7)
EOSINOPHIL NFR BLD AUTO: 0.2 %
ERYTHROCYTE [DISTWIDTH] IN BLOOD BY AUTOMATED COUNT: 12.9 % (ref 11–15)
GLOBULIN PLAS-MCNC: 4.7 G/DL (ref 2.8–4.4)
GLUCOSE BLD-MCNC: 81 MG/DL (ref 70–99)
GLUCOSE UR STRIP.AUTO-MCNC: NEGATIVE MG/DL
HCT VFR BLD AUTO: 45.5 % (ref 35–48)
HGB BLD-MCNC: 14.9 G/DL (ref 12–16)
IMM GRANULOCYTES # BLD AUTO: 0.02 X10(3) UL (ref 0–1)
IMM GRANULOCYTES NFR BLD: 0.2 %
KETONES UR STRIP.AUTO-MCNC: 20 MG/DL
LYMPHOCYTES # BLD AUTO: 1.39 X10(3) UL (ref 1–4)
LYMPHOCYTES NFR BLD AUTO: 14.7 %
M PROTEIN MFR SERPL ELPH: 8.4 G/DL (ref 6.4–8.2)
MCH RBC QN AUTO: 31.1 PG (ref 26–34)
MCHC RBC AUTO-ENTMCNC: 32.7 G/DL (ref 31–37)
MCV RBC AUTO: 95 FL (ref 80–100)
MONOCYTES # BLD AUTO: 0.41 X10(3) UL (ref 0.1–1)
MONOCYTES NFR BLD AUTO: 4.3 %
NEUTROPHILS # BLD AUTO: 7.55 X10 (3) UL (ref 1.5–7.7)
NEUTROPHILS # BLD AUTO: 7.55 X10(3) UL (ref 1.5–7.7)
NEUTROPHILS NFR BLD AUTO: 80.2 %
NITRITE UR QL STRIP.AUTO: NEGATIVE
OSMOLALITY SERPL CALC.SUM OF ELEC: 278 MOSM/KG (ref 275–295)
PH UR STRIP.AUTO: 6 [PH] (ref 4.5–8)
PLATELET # BLD AUTO: 330 10(3)UL (ref 150–450)
POTASSIUM SERPL-SCNC: 4.5 MMOL/L (ref 3.5–5.1)
PROT UR STRIP.AUTO-MCNC: NEGATIVE MG/DL
RBC # BLD AUTO: 4.79 X10(6)UL (ref 3.8–5.3)
RBC UR QL AUTO: NEGATIVE
SODIUM SERPL-SCNC: 135 MMOL/L (ref 136–145)
SP GR UR STRIP.AUTO: 1.02 (ref 1–1.03)
UROBILINOGEN UR STRIP.AUTO-MCNC: <2 MG/DL
WBC # BLD AUTO: 9.4 X10(3) UL (ref 4–11)

## 2019-07-23 PROCEDURE — 99283 EMERGENCY DEPT VISIT LOW MDM: CPT

## 2019-07-23 PROCEDURE — 81001 URINALYSIS AUTO W/SCOPE: CPT | Performed by: EMERGENCY MEDICINE

## 2019-07-23 PROCEDURE — 80053 COMPREHEN METABOLIC PANEL: CPT | Performed by: EMERGENCY MEDICINE

## 2019-07-23 PROCEDURE — 36415 COLL VENOUS BLD VENIPUNCTURE: CPT

## 2019-07-23 PROCEDURE — 85025 COMPLETE CBC W/AUTO DIFF WBC: CPT | Performed by: EMERGENCY MEDICINE

## 2019-07-23 PROCEDURE — 87086 URINE CULTURE/COLONY COUNT: CPT | Performed by: EMERGENCY MEDICINE

## 2019-07-23 PROCEDURE — 72110 X-RAY EXAM L-2 SPINE 4/>VWS: CPT | Performed by: EMERGENCY MEDICINE

## 2019-07-23 NOTE — ED PROVIDER NOTES
Patient Seen in: [facility] Emergency Department    History   Patient presents with:  Numbness Weakness (neurologic)    Stated Complaint: Seen here 10 days ago for kidney stone.  Now exp numbness/tingling to left hip, *    HPI    52-year-old female p 116/73   Pulse 07/23/19 0853 92   Resp 07/23/19 0853 18   Temp 07/23/19 0853 98.3 °F (36.8 °C)   Temp src 07/23/19 0853 Temporal   SpO2 07/23/19 1015 100 %   O2 Device 07/23/19 1015 None (Room air)       Current:BP 96/57   Pulse 94   Temp 98.3 °F (36.8 °C) DRAW LIGHT GREEN   RAINBOW DRAW GOLD   URINE CULTURE, ROUTINE   CBC W/ DIFFERENTIAL          Xr Lumbar Spine (min 4 Views) (cpt=72110)    Result Date: 7/23/2019  PROCEDURE:  XR LUMBAR SPINE (MIN 4 VIEWS) (CPT=72110)  TECHNIQUE:  AP, lateral, oblique, and c MEASUREMENTS:  10.8 cm ECHOGENICITY:  Normal. HYDRONEPHROSIS:  Minimal pelvocaliectasis. CYSTS/STONES/MASSES:  There is marked medullary hyper echogenicity consistent with nephrocalcinosis as viewed on the prior CT scan from 6/18/2019.   This could also ref [address] [facility] [address], DO  [address] [facility]  [address] [facility]  Karlie Bowling [phone]    Call  neurology        Medications Prescribed:  Current Discharge Medication List

## 2019-07-23 NOTE — ED INITIAL ASSESSMENT (HPI)
Patient presents with c/o left leg numbness and flank pain. She reports she was diagnosed with a kidney stone a few weeks ago, had subsequent UTI and hematuria which she has had up until a day or two ago. She still feels urinary urgency.  Now with the numbn

## 2019-07-24 ENCOUNTER — TELEPHONE (OUTPATIENT)
Dept: FAMILY MEDICINE CLINIC | Facility: CLINIC | Age: 29
End: 2019-07-24

## 2019-07-24 NOTE — TELEPHONE ENCOUNTER
Pt was seen in ED on 7/23/19 for numbness /tingling to left hip/thigh. Also 10 days ago seen for kidney stones. She is to fup ASAP with CZ. There was no answer, no vm on her phone.

## 2019-09-04 ENCOUNTER — TELEPHONE (OUTPATIENT)
Dept: NEUROLOGY | Facility: CLINIC | Age: 29
End: 2019-09-04

## 2019-11-17 ENCOUNTER — HOSPITAL ENCOUNTER (EMERGENCY)
Facility: HOSPITAL | Age: 29
Discharge: HOME | End: 2019-11-17
Attending: EMERGENCY MEDICINE
Payer: MEDICAID

## 2019-11-17 VITALS
BODY MASS INDEX: 27.56 KG/M2 | SYSTOLIC BLOOD PRESSURE: 109 MMHG | OXYGEN SATURATION: 100 % | HEART RATE: 89 BPM | RESPIRATION RATE: 14 BRPM | WEIGHT: 146 LBS | HEIGHT: 61 IN | TEMPERATURE: 99 F | DIASTOLIC BLOOD PRESSURE: 64 MMHG

## 2019-11-17 DIAGNOSIS — J02.9 VIRAL PHARYNGITIS: Primary | ICD-10-CM

## 2019-11-17 PROCEDURE — 99283 EMERGENCY DEPT VISIT LOW MDM: CPT

## 2019-11-17 PROCEDURE — 87081 CULTURE SCREEN ONLY: CPT | Performed by: EMERGENCY MEDICINE

## 2019-11-17 PROCEDURE — 87430 STREP A AG IA: CPT | Performed by: EMERGENCY MEDICINE

## 2019-11-17 NOTE — ED PROVIDER NOTES
Patient Seen in: [facility] Emergency Department      History   Patient presents with:  Sore Throat    Stated Complaint: sore throat, feels like lump in throat today, swallowing without difficulty    HPI    81-year-old female presents to the [place] lesions. Neuro exam: Awake, conversive and moving all 4 extremities well. ED Course     Labs Reviewed   RAPID STREP A SCREEN (LC) - Normal   GRP A STREP CULT, THROAT          Test results and treatment plan were discussed prior to discharge.           Myesha Narayanan

## 2019-11-22 ENCOUNTER — OFFICE VISIT (OUTPATIENT)
Dept: FAMILY MEDICINE CLINIC | Facility: CLINIC | Age: 29
End: 2019-11-22
Payer: MEDICAID

## 2019-11-22 VITALS
SYSTOLIC BLOOD PRESSURE: 110 MMHG | DIASTOLIC BLOOD PRESSURE: 68 MMHG | RESPIRATION RATE: 20 BRPM | HEIGHT: 61 IN | OXYGEN SATURATION: 99 % | BODY MASS INDEX: 29.45 KG/M2 | WEIGHT: 156 LBS | TEMPERATURE: 100 F | HEART RATE: 66 BPM

## 2019-11-22 DIAGNOSIS — Z12.4 CERVICAL CANCER SCREENING: ICD-10-CM

## 2019-11-22 DIAGNOSIS — N89.8 VAGINAL DISCHARGE: Primary | ICD-10-CM

## 2019-11-22 PROCEDURE — 99213 OFFICE O/P EST LOW 20 MIN: CPT | Performed by: PHYSICIAN ASSISTANT

## 2019-11-22 PROCEDURE — 88175 CYTOPATH C/V AUTO FLUID REDO: CPT | Performed by: PHYSICIAN ASSISTANT

## 2019-11-22 PROCEDURE — 87660 TRICHOMONAS VAGIN DIR PROBE: CPT | Performed by: PHYSICIAN ASSISTANT

## 2019-11-22 PROCEDURE — 87510 GARDNER VAG DNA DIR PROBE: CPT | Performed by: PHYSICIAN ASSISTANT

## 2019-11-22 PROCEDURE — 87480 CANDIDA DNA DIR PROBE: CPT | Performed by: PHYSICIAN ASSISTANT

## 2019-11-22 RX ORDER — METRONIDAZOLE 500 MG/1
500 TABLET ORAL 2 TIMES DAILY
Qty: 14 TABLET | Refills: 0 | Status: SHIPPED | OUTPATIENT
Start: 2019-11-22 | End: 2019-11-29

## 2019-11-22 NOTE — PROGRESS NOTES
HPI:    Patient ID: Amanda Broussard is a 30year old female. HPI   Patient here with c/o vaginal discharge for few days. She has history of recurrent BV and symptoms are similar. Her discharge is white and odor is fishy.  No vaginal bleeding or irritati No erythema, tenderness or bleeding in the vagina. Neurological: She is alert. Psychiatric: She has a normal mood and affect. ASSESSMENT/PLAN:   1. Vaginal discharge  Check vaginitis screen.  Empirically tx with metronidazole as directed,

## 2019-11-26 ENCOUNTER — TELEPHONE (OUTPATIENT)
Dept: FAMILY MEDICINE CLINIC | Facility: CLINIC | Age: 29
End: 2019-11-26

## 2020-02-12 ENCOUNTER — OFFICE VISIT (OUTPATIENT)
Dept: FAMILY MEDICINE CLINIC | Facility: CLINIC | Age: 30
End: 2020-02-12
Payer: MEDICAID

## 2020-02-12 VITALS
RESPIRATION RATE: 16 BRPM | BODY MASS INDEX: 31.72 KG/M2 | WEIGHT: 168 LBS | HEART RATE: 84 BPM | SYSTOLIC BLOOD PRESSURE: 116 MMHG | DIASTOLIC BLOOD PRESSURE: 78 MMHG | HEIGHT: 61 IN | OXYGEN SATURATION: 98 % | TEMPERATURE: 98 F

## 2020-02-12 DIAGNOSIS — Z11.3 SCREENING EXAMINATION FOR STD (SEXUALLY TRANSMITTED DISEASE): ICD-10-CM

## 2020-02-12 DIAGNOSIS — N89.8 VAGINAL DISCHARGE: Primary | ICD-10-CM

## 2020-02-12 PROCEDURE — 87491 CHLMYD TRACH DNA AMP PROBE: CPT | Performed by: PHYSICIAN ASSISTANT

## 2020-02-12 PROCEDURE — 87591 N.GONORRHOEAE DNA AMP PROB: CPT | Performed by: PHYSICIAN ASSISTANT

## 2020-02-12 PROCEDURE — 87480 CANDIDA DNA DIR PROBE: CPT | Performed by: PHYSICIAN ASSISTANT

## 2020-02-12 PROCEDURE — 87660 TRICHOMONAS VAGIN DIR PROBE: CPT | Performed by: PHYSICIAN ASSISTANT

## 2020-02-12 PROCEDURE — 99213 OFFICE O/P EST LOW 20 MIN: CPT | Performed by: PHYSICIAN ASSISTANT

## 2020-02-12 PROCEDURE — 87510 GARDNER VAG DNA DIR PROBE: CPT | Performed by: PHYSICIAN ASSISTANT

## 2020-02-12 RX ORDER — METRONIDAZOLE 500 MG/1
500 TABLET ORAL 2 TIMES DAILY
Qty: 14 TABLET | Refills: 0 | Status: SHIPPED | OUTPATIENT
Start: 2020-02-12 | End: 2020-02-19

## 2020-02-12 NOTE — PROGRESS NOTES
HPI:    Patient ID: Мария Bain is a 30year old female. HPI   Patient presents today with recurrent symptoms of vaginal discharge and odor similar to when she has had BV in the past.  She has had symptoms for 1 week and things are not improving. tenderness and no fullness. Left adnexum displays no mass, no tenderness and no fullness. Vaginal discharge present. No vaginal erythema, tenderness or bleeding. No erythema, tenderness or bleeding in the vagina. Neurological: She is alert.    P

## 2020-02-13 LAB
C TRACH DNA SPEC QL NAA+PROBE: NEGATIVE
N GONORRHOEA DNA SPEC QL NAA+PROBE: NEGATIVE

## 2020-03-05 ENCOUNTER — HOSPITAL ENCOUNTER (EMERGENCY)
Facility: HOSPITAL | Age: 30
Discharge: HOME | End: 2020-03-05
Attending: EMERGENCY MEDICINE
Payer: MEDICAID

## 2020-03-05 VITALS
HEART RATE: 86 BPM | TEMPERATURE: 98 F | BODY MASS INDEX: 31.15 KG/M2 | OXYGEN SATURATION: 100 % | RESPIRATION RATE: 16 BRPM | SYSTOLIC BLOOD PRESSURE: 102 MMHG | DIASTOLIC BLOOD PRESSURE: 64 MMHG | HEIGHT: 61 IN | WEIGHT: 165 LBS

## 2020-03-05 DIAGNOSIS — R11.2 NAUSEA AND VOMITING IN ADULT: ICD-10-CM

## 2020-03-05 DIAGNOSIS — R11.0 NAUSEA: ICD-10-CM

## 2020-03-05 DIAGNOSIS — R31.9 URINARY TRACT INFECTION WITH HEMATURIA, SITE UNSPECIFIED: Primary | ICD-10-CM

## 2020-03-05 DIAGNOSIS — N39.0 URINARY TRACT INFECTION WITH HEMATURIA, SITE UNSPECIFIED: Primary | ICD-10-CM

## 2020-03-05 LAB
ANION GAP SERPL CALC-SCNC: 3 MMOL/L (ref 0–18)
BASOPHILS # BLD AUTO: 0.07 X10(3) UL (ref 0–0.2)
BASOPHILS NFR BLD AUTO: 0.7 %
BILIRUB UR QL STRIP.AUTO: NEGATIVE
BUN BLD-MCNC: 9 MG/DL (ref 7–18)
BUN/CREAT SERPL: 11 (ref 10–20)
CALCIUM BLD-MCNC: 8.9 MG/DL (ref 8.5–10.1)
CHLORIDE SERPL-SCNC: 106 MMOL/L (ref 98–112)
CO2 SERPL-SCNC: 27 MMOL/L (ref 21–32)
COLOR UR AUTO: YELLOW
CREAT BLD-MCNC: 0.82 MG/DL (ref 0.55–1.02)
DEPRECATED RDW RBC AUTO: 48.8 FL (ref 35.1–46.3)
EOSINOPHIL # BLD AUTO: 0.27 X10(3) UL (ref 0–0.7)
EOSINOPHIL NFR BLD AUTO: 2.7 %
ERYTHROCYTE [DISTWIDTH] IN BLOOD BY AUTOMATED COUNT: 14.1 % (ref 11–15)
EXPIRATION DATE: 2021
GLUCOSE BLD-MCNC: 74 MG/DL (ref 70–99)
GLUCOSE UR STRIP.AUTO-MCNC: NEGATIVE MG/DL
HCT VFR BLD AUTO: 43.6 % (ref 35–48)
HGB BLD-MCNC: 14.8 G/DL (ref 12–16)
IMM GRANULOCYTES # BLD AUTO: 0.03 X10(3) UL (ref 0–1)
IMM GRANULOCYTES NFR BLD: 0.3 %
KETONES UR STRIP.AUTO-MCNC: NEGATIVE MG/DL
LYMPHOCYTES # BLD AUTO: 2.04 X10(3) UL (ref 1–4)
LYMPHOCYTES NFR BLD AUTO: 20.2 %
MCH RBC QN AUTO: 32 PG (ref 26–34)
MCHC RBC AUTO-ENTMCNC: 33.9 G/DL (ref 31–37)
MCV RBC AUTO: 94.2 FL (ref 80–100)
MONOCYTES # BLD AUTO: 0.93 X10(3) UL (ref 0.1–1)
MONOCYTES NFR BLD AUTO: 9.2 %
NEUTROPHILS # BLD AUTO: 6.74 X10 (3) UL (ref 1.5–7.7)
NEUTROPHILS # BLD AUTO: 6.74 X10(3) UL (ref 1.5–7.7)
NEUTROPHILS NFR BLD AUTO: 66.9 %
NITRITE UR QL STRIP.AUTO: POSITIVE
OSMOLALITY SERPL CALC.SUM OF ELEC: 279 MOSM/KG (ref 275–295)
PH UR STRIP.AUTO: 6 [PH] (ref 4.5–8)
PLATELET # BLD AUTO: 266 10(3)UL (ref 150–450)
POCT LOT NUMBER: NORMAL
POCT URINE PREGNANCY: NEGATIVE
POTASSIUM SERPL-SCNC: 4.4 MMOL/L (ref 3.5–5.1)
PROT UR STRIP.AUTO-MCNC: NEGATIVE MG/DL
RBC # BLD AUTO: 4.63 X10(6)UL (ref 3.8–5.3)
RBC #/AREA URNS AUTO: >10 /HPF
SODIUM SERPL-SCNC: 136 MMOL/L (ref 136–145)
SP GR UR STRIP.AUTO: 1.01 (ref 1–1.03)
UROBILINOGEN UR STRIP.AUTO-MCNC: <2 MG/DL
WBC # BLD AUTO: 10.1 X10(3) UL (ref 4–11)
WBC #/AREA URNS AUTO: >50 /HPF

## 2020-03-05 PROCEDURE — 96375 TX/PRO/DX INJ NEW DRUG ADDON: CPT

## 2020-03-05 PROCEDURE — 81001 URINALYSIS AUTO W/SCOPE: CPT

## 2020-03-05 PROCEDURE — 96361 HYDRATE IV INFUSION ADD-ON: CPT

## 2020-03-05 PROCEDURE — 87186 SC STD MICRODIL/AGAR DIL: CPT | Performed by: EMERGENCY MEDICINE

## 2020-03-05 PROCEDURE — 81025 URINE PREGNANCY TEST: CPT

## 2020-03-05 PROCEDURE — 87086 URINE CULTURE/COLONY COUNT: CPT | Performed by: EMERGENCY MEDICINE

## 2020-03-05 PROCEDURE — 87088 URINE BACTERIA CULTURE: CPT | Performed by: EMERGENCY MEDICINE

## 2020-03-05 PROCEDURE — 99284 EMERGENCY DEPT VISIT MOD MDM: CPT

## 2020-03-05 PROCEDURE — 81001 URINALYSIS AUTO W/SCOPE: CPT | Performed by: EMERGENCY MEDICINE

## 2020-03-05 PROCEDURE — 96365 THER/PROPH/DIAG IV INF INIT: CPT

## 2020-03-05 PROCEDURE — 80048 BASIC METABOLIC PNL TOTAL CA: CPT | Performed by: PHYSICIAN ASSISTANT

## 2020-03-05 PROCEDURE — 85025 COMPLETE CBC W/AUTO DIFF WBC: CPT | Performed by: PHYSICIAN ASSISTANT

## 2020-03-05 RX ORDER — ONDANSETRON 2 MG/ML
4 INJECTION INTRAMUSCULAR; INTRAVENOUS ONCE
Status: COMPLETED | OUTPATIENT
Start: 2020-03-05 | End: 2020-03-05

## 2020-03-05 RX ORDER — ONDANSETRON 4 MG/1
4 TABLET, ORALLY DISINTEGRATING ORAL EVERY 4 HOURS PRN
Qty: 10 TABLET | Refills: 0 | Status: SHIPPED | OUTPATIENT
Start: 2020-03-05 | End: 2020-03-12

## 2020-03-05 RX ORDER — KETOROLAC TROMETHAMINE 30 MG/ML
15 INJECTION, SOLUTION INTRAMUSCULAR; INTRAVENOUS ONCE
Status: COMPLETED | OUTPATIENT
Start: 2020-03-05 | End: 2020-03-05

## 2020-03-05 RX ORDER — CEPHALEXIN 500 MG/1
500 CAPSULE ORAL 3 TIMES DAILY
Qty: 15 CAPSULE | Refills: 0 | Status: SHIPPED | OUTPATIENT
Start: 2020-03-05 | End: 2020-03-10

## 2020-03-05 NOTE — ED NOTES
Pt refusing to be triaged at this time, stated she wants to just have everything done once she is in a room because she does not want to be moving from one place to another.

## 2020-03-05 NOTE — ED NOTES
Patient presents with large drink in hand from [facility]. Patient was instructed to remain NPO until seen by physician.

## 2020-03-05 NOTE — ED INITIAL ASSESSMENT (HPI)
Pt reports NVD since 2am this morning accompanied with left sided headache and body aches an'd chills. De'nies abdominal pain, dysuria, or fever. Hx of  \"dark urine\".  Last took tylenol at 10am

## 2020-03-06 NOTE — ED PROVIDER NOTES
I reviewed that chart and discussed the case. I have examined the patient and noted nausea, vomiting, UA consistent with UTI. Labs unremarkable. Nontoxic appearance. Patient given IV Rocephin and will be discharged with antibiotics.       I agree with t

## 2020-03-06 NOTE — ED PROVIDER NOTES
Patient Seen in: [facility] Emergency Department      History   Patient presents with:  Nausea/Vomiting/Diarrhea    Stated Complaint: nvd, chills    HPI    Eilleen Otilia is a 28-year-old female presents today for evaluation of tactile fever, chills, naus Oriented to person, place, and time. Patient appears well-developed and well-nourished, non-toxic and in no acute distress. Head: Normocephalic and atraumatic.    Eyes: PERRLA, EOMI, no periorbital edeam, anicteric, normal conjuctiva  ENT: Normal TMs,  no orders. POCT PREGNANCY, URINE   RAINBOW DRAW BLUE   RAINBOW DRAW LAVENDER   RAINBOW DRAW LIGHT GREEN   RAINBOW DRAW GOLD   URINE CULTURE, ROUTINE          This case is discussed with Dr. Angel Welch who evaluates patient agrees with plan of care.     Jose

## 2020-04-10 ENCOUNTER — VIRTUAL PHONE E/M (OUTPATIENT)
Dept: FAMILY MEDICINE CLINIC | Facility: CLINIC | Age: 30
End: 2020-04-10
Payer: MEDICAID

## 2020-04-10 DIAGNOSIS — N89.8 VAGINAL DISCHARGE: Primary | ICD-10-CM

## 2020-04-10 RX ORDER — CLINDAMYCIN HYDROCHLORIDE 300 MG/1
300 CAPSULE ORAL 2 TIMES DAILY
Qty: 14 CAPSULE | Refills: 0 | Status: SHIPPED | OUTPATIENT
Start: 2020-04-10 | End: 2020-11-30

## 2020-04-10 NOTE — PROGRESS NOTES
Virtual Telephone Check-In    Damon Art verbally consents to a Virtual/Telephone Check-In visit on 04/10/20. Patient understands and accepts financial responsibility for any deductible, co-insurance and/or co-pays associated with this service. BV complaining of the same symptoms again. Will empirically treat with clindamycin twice daily for 7 days. Advised to take with food and probiotic. Discussed self-care and preventative measures.   Contact the office if symptoms worsen or persist.

## 2020-06-23 ENCOUNTER — HOSPITAL ENCOUNTER (EMERGENCY)
Facility: HOSPITAL | Age: 30
Discharge: HOME | End: 2020-06-23
Attending: EMERGENCY MEDICINE
Payer: MEDICAID

## 2020-06-23 ENCOUNTER — APPOINTMENT (OUTPATIENT)
Dept: CT IMAGING | Facility: HOSPITAL | Age: 30
End: 2020-06-23
Attending: EMERGENCY MEDICINE
Payer: MEDICAID

## 2020-06-23 VITALS
OXYGEN SATURATION: 100 % | DIASTOLIC BLOOD PRESSURE: 70 MMHG | HEART RATE: 79 BPM | SYSTOLIC BLOOD PRESSURE: 106 MMHG | WEIGHT: 165 LBS | HEIGHT: 61 IN | BODY MASS INDEX: 31.15 KG/M2 | RESPIRATION RATE: 20 BRPM | TEMPERATURE: 98 F

## 2020-06-23 DIAGNOSIS — N30.00 ACUTE CYSTITIS WITHOUT HEMATURIA: Primary | ICD-10-CM

## 2020-06-23 PROCEDURE — 87088 URINE BACTERIA CULTURE: CPT | Performed by: EMERGENCY MEDICINE

## 2020-06-23 PROCEDURE — 96365 THER/PROPH/DIAG IV INF INIT: CPT

## 2020-06-23 PROCEDURE — 81001 URINALYSIS AUTO W/SCOPE: CPT | Performed by: EMERGENCY MEDICINE

## 2020-06-23 PROCEDURE — 96375 TX/PRO/DX INJ NEW DRUG ADDON: CPT

## 2020-06-23 PROCEDURE — 80053 COMPREHEN METABOLIC PANEL: CPT | Performed by: EMERGENCY MEDICINE

## 2020-06-23 PROCEDURE — 87186 SC STD MICRODIL/AGAR DIL: CPT | Performed by: EMERGENCY MEDICINE

## 2020-06-23 PROCEDURE — 81025 URINE PREGNANCY TEST: CPT

## 2020-06-23 PROCEDURE — 96361 HYDRATE IV INFUSION ADD-ON: CPT

## 2020-06-23 PROCEDURE — 83690 ASSAY OF LIPASE: CPT | Performed by: EMERGENCY MEDICINE

## 2020-06-23 PROCEDURE — 85025 COMPLETE CBC W/AUTO DIFF WBC: CPT | Performed by: EMERGENCY MEDICINE

## 2020-06-23 PROCEDURE — 99285 EMERGENCY DEPT VISIT HI MDM: CPT

## 2020-06-23 PROCEDURE — 74176 CT ABD & PELVIS W/O CONTRAST: CPT | Performed by: EMERGENCY MEDICINE

## 2020-06-23 PROCEDURE — 99284 EMERGENCY DEPT VISIT MOD MDM: CPT

## 2020-06-23 PROCEDURE — 87086 URINE CULTURE/COLONY COUNT: CPT | Performed by: EMERGENCY MEDICINE

## 2020-06-23 RX ORDER — ONDANSETRON 2 MG/ML
4 INJECTION INTRAMUSCULAR; INTRAVENOUS ONCE
Status: DISCONTINUED | OUTPATIENT
Start: 2020-06-23 | End: 2020-06-23

## 2020-06-23 RX ORDER — KETOROLAC TROMETHAMINE 30 MG/ML
15 INJECTION, SOLUTION INTRAMUSCULAR; INTRAVENOUS ONCE
Status: COMPLETED | OUTPATIENT
Start: 2020-06-23 | End: 2020-06-23

## 2020-06-23 RX ORDER — SODIUM CHLORIDE 9 MG/ML
INJECTION, SOLUTION INTRAVENOUS CONTINUOUS
Status: DISCONTINUED | OUTPATIENT
Start: 2020-06-23 | End: 2020-06-23

## 2020-06-23 RX ORDER — SULFAMETHOXAZOLE AND TRIMETHOPRIM 800; 160 MG/1; MG/1
1 TABLET ORAL 2 TIMES DAILY
Qty: 14 TABLET | Refills: 0 | Status: SHIPPED | OUTPATIENT
Start: 2020-06-23 | End: 2020-06-30

## 2020-06-23 RX ORDER — COVID-19 ANTIGEN TEST
220 KIT MISCELLANEOUS
COMMUNITY
End: 2021-03-23

## 2020-06-23 RX ORDER — HYDROMORPHONE HYDROCHLORIDE 1 MG/ML
0.5 INJECTION, SOLUTION INTRAMUSCULAR; INTRAVENOUS; SUBCUTANEOUS EVERY 30 MIN PRN
Status: DISCONTINUED | OUTPATIENT
Start: 2020-06-23 | End: 2020-06-23

## 2020-06-23 NOTE — ED PROVIDER NOTES
Patient Seen in: [facility] Emergency Department      History   Patient presents with:  Abdomen/Flank Pain    Stated Complaint: flank pain, denies v/d, afebrile     HPI    71-year-old female comes to the hospital complaint of having difficulty with midline, No LAD  Heart: S1S2 normal. No murmurs, regular rate and rhythm  Lungs: Clear to auscultation bilaterally  Abdomen: Soft left side is mildly tender nondistended normal active bowel sounds without rebound, guarding or masses noted  Back nontender w Oral)(cpt=74176)    Result Date: 6/23/2020  PROCEDURE:  CT ABDOMEN+PELVIS uSzanne Ugalde 2D RNDR(NO IV,NO ORAL)(CPT=74176)  COMPARISON:  US Ac KIDNEY/BLADDER (EIN=12374), 7/12/2019, 3:16 AM.  [facility] , CT, CT ABDOMEN+PELVIS KIDNEYSTONE 2D RNDR(NO IV, involves visualized lung bases, right greater than left, correlate clinically. Recommend follow-up to ensure resolution as clinically indicated.    Dictated by: Lexa Conroy MD on 6/23/2020 at 11:54 AM     Finalized by: Lexa Conroy MD on 6/23/2020 at 11:59 AM

## 2020-06-23 NOTE — ED NOTES
Pt reevaluated by er physician. informed of all her test report and plan of care.  Pt verbalizing understnding

## 2020-11-30 ENCOUNTER — TELEMEDICINE (OUTPATIENT)
Dept: FAMILY MEDICINE CLINIC | Facility: CLINIC | Age: 30
End: 2020-11-30
Payer: MEDICAID

## 2020-11-30 DIAGNOSIS — N76.0 BACTERIAL VAGINOSIS: Primary | ICD-10-CM

## 2020-11-30 DIAGNOSIS — B96.89 BACTERIAL VAGINOSIS: Primary | ICD-10-CM

## 2020-11-30 PROBLEM — R10.9 LEFT FLANK PAIN: Status: RESOLVED | Noted: 2019-06-19 | Resolved: 2020-11-30

## 2020-11-30 PROCEDURE — 99213 OFFICE O/P EST LOW 20 MIN: CPT | Performed by: PHYSICIAN ASSISTANT

## 2020-11-30 RX ORDER — CLINDAMYCIN HYDROCHLORIDE 300 MG/1
300 CAPSULE ORAL 2 TIMES DAILY
Qty: 14 CAPSULE | Refills: 0 | Status: SHIPPED | OUTPATIENT
Start: 2020-11-30 | End: 2021-03-23

## 2020-11-30 NOTE — PROGRESS NOTES
Video Visit    Bailee Belcher is a 35year old female. No chief complaint on file. HPI:   Patient with history of recurrent bacterial vaginosis presents with a flareup of symptoms.   She was last treated for this in for the last 3 days she has had v bruising. PSYCH: Denies anxiety and depression. EXAM:   GENERAL: well developed, well nourished, NAD. HEENT: AT/NC. Normal lips, gums, teeth, tongue. LUNGS: Speaking in complete sentences comfortably without increased work of breathing.   SKIN: nor the [facility] website. The patient verbally agreed to telehealth consent form, related consents and the risks discussed. Lastly, the patient confirmed that they were in [place].    Included in this visit, time may have been spent reviewing labs, medications,

## 2021-03-23 ENCOUNTER — HOSPITAL ENCOUNTER (INPATIENT)
Facility: HOSPITAL | Age: 31
LOS: 2 days | Discharge: HOME | DRG: 690 | End: 2021-03-25
Attending: EMERGENCY MEDICINE | Admitting: HOSPITALIST
Payer: MEDICAID

## 2021-03-23 ENCOUNTER — APPOINTMENT (OUTPATIENT)
Dept: CT IMAGING | Facility: HOSPITAL | Age: 31
DRG: 690 | End: 2021-03-23
Attending: EMERGENCY MEDICINE
Payer: MEDICAID

## 2021-03-23 DIAGNOSIS — N12 PYELONEPHRITIS: Primary | ICD-10-CM

## 2021-03-23 LAB
ALBUMIN SERPL-MCNC: 3.1 G/DL (ref 3.4–5)
ALBUMIN/GLOB SERPL: 0.7 {RATIO} (ref 1–2)
ALP LIVER SERPL-CCNC: 87 U/L
ALT SERPL-CCNC: 23 U/L
ANION GAP SERPL CALC-SCNC: 6 MMOL/L (ref 0–18)
AST SERPL-CCNC: 11 U/L (ref 15–37)
BASOPHILS # BLD AUTO: 0.04 X10(3) UL (ref 0–0.2)
BASOPHILS NFR BLD AUTO: 0.2 %
BILIRUB SERPL-MCNC: 0.7 MG/DL (ref 0.1–2)
BILIRUB UR QL STRIP.AUTO: NEGATIVE
BUN BLD-MCNC: 12 MG/DL (ref 7–18)
BUN/CREAT SERPL: 11.9 (ref 10–20)
CALCIUM BLD-MCNC: 8.9 MG/DL (ref 8.5–10.1)
CHLORIDE SERPL-SCNC: 102 MMOL/L (ref 98–112)
CO2 SERPL-SCNC: 26 MMOL/L (ref 21–32)
COLOR UR AUTO: YELLOW
CREAT BLD-MCNC: 1.01 MG/DL
DEPRECATED RDW RBC AUTO: 44.7 FL (ref 35.1–46.3)
EOSINOPHIL # BLD AUTO: 0.04 X10(3) UL (ref 0–0.7)
EOSINOPHIL NFR BLD AUTO: 0.2 %
ERYTHROCYTE [DISTWIDTH] IN BLOOD BY AUTOMATED COUNT: 13.3 % (ref 11–15)
GLOBULIN PLAS-MCNC: 4.7 G/DL (ref 2.8–4.4)
GLUCOSE BLD-MCNC: 126 MG/DL (ref 70–99)
GLUCOSE UR STRIP.AUTO-MCNC: NEGATIVE MG/DL
HCT VFR BLD AUTO: 39.1 %
HGB BLD-MCNC: 13.5 G/DL
IMM GRANULOCYTES # BLD AUTO: 0.12 X10(3) UL (ref 0–1)
IMM GRANULOCYTES NFR BLD: 0.6 %
LACTATE SERPL-SCNC: 1.2 MMOL/L (ref 0.4–2)
LACTATE SERPL-SCNC: 2.1 MMOL/L (ref 0.4–2)
LIPASE SERPL-CCNC: 70 U/L (ref 73–393)
LYMPHOCYTES # BLD AUTO: 0.84 X10(3) UL (ref 1–4)
LYMPHOCYTES NFR BLD AUTO: 4.5 %
M PROTEIN MFR SERPL ELPH: 7.8 G/DL (ref 6.4–8.2)
MCH RBC QN AUTO: 31.1 PG (ref 26–34)
MCHC RBC AUTO-ENTMCNC: 34.5 G/DL (ref 31–37)
MCV RBC AUTO: 90.1 FL
MONOCYTES # BLD AUTO: 1.73 X10(3) UL (ref 0.1–1)
MONOCYTES NFR BLD AUTO: 9.2 %
NEUTROPHILS # BLD AUTO: 16.03 X10 (3) UL (ref 1.5–7.7)
NEUTROPHILS # BLD AUTO: 16.03 X10(3) UL (ref 1.5–7.7)
NEUTROPHILS NFR BLD AUTO: 85.3 %
NITRITE UR QL STRIP.AUTO: POSITIVE
OSMOLALITY SERPL CALC.SUM OF ELEC: 279 MOSM/KG (ref 275–295)
PH UR STRIP.AUTO: 6 [PH] (ref 5–8)
PLATELET # BLD AUTO: 235 10(3)UL (ref 150–450)
POCT LOT NUMBER: NORMAL
POCT URINE PREGNANCY: NEGATIVE
POTASSIUM SERPL-SCNC: 3.4 MMOL/L (ref 3.5–5.1)
PROCEDURE CONTROL: YES
PROT UR STRIP.AUTO-MCNC: 100 MG/DL
RBC # BLD AUTO: 4.34 X10(6)UL
RBC #/AREA URNS AUTO: >10 /HPF
SARS-COV-2 RNA RESP QL NAA+PROBE: NOT DETECTED
SODIUM SERPL-SCNC: 134 MMOL/L (ref 136–145)
SP GR UR STRIP.AUTO: 1.01 (ref 1–1.03)
UROBILINOGEN UR STRIP.AUTO-MCNC: <2 MG/DL
WBC # BLD AUTO: 18.8 X10(3) UL (ref 4–11)
WBC #/AREA URNS AUTO: >50 /HPF
WBC CLUMPS UR QL AUTO: PRESENT /HPF

## 2021-03-23 PROCEDURE — 99223 1ST HOSP IP/OBS HIGH 75: CPT | Performed by: HOSPITALIST

## 2021-03-23 PROCEDURE — 74176 CT ABD & PELVIS W/O CONTRAST: CPT | Performed by: EMERGENCY MEDICINE

## 2021-03-23 RX ORDER — KETOROLAC TROMETHAMINE 15 MG/ML
15 INJECTION, SOLUTION INTRAMUSCULAR; INTRAVENOUS EVERY 6 HOURS PRN
Status: DISPENSED | OUTPATIENT
Start: 2021-03-23 | End: 2021-03-25

## 2021-03-23 RX ORDER — KETOROLAC TROMETHAMINE 30 MG/ML
15 INJECTION, SOLUTION INTRAMUSCULAR; INTRAVENOUS ONCE
Status: COMPLETED | OUTPATIENT
Start: 2021-03-23 | End: 2021-03-23

## 2021-03-23 RX ORDER — METOCLOPRAMIDE HYDROCHLORIDE 5 MG/ML
10 INJECTION INTRAMUSCULAR; INTRAVENOUS ONCE
Status: COMPLETED | OUTPATIENT
Start: 2021-03-23 | End: 2021-03-23

## 2021-03-23 RX ORDER — SODIUM CHLORIDE 9 MG/ML
INJECTION, SOLUTION INTRAVENOUS
Status: COMPLETED | OUTPATIENT
Start: 2021-03-23 | End: 2021-03-23

## 2021-03-23 RX ORDER — BISACODYL 10 MG
10 SUPPOSITORY, RECTAL RECTAL
Status: DISCONTINUED | OUTPATIENT
Start: 2021-03-23 | End: 2021-03-25

## 2021-03-23 RX ORDER — POLYETHYLENE GLYCOL 3350 17 G/17G
17 POWDER, FOR SOLUTION ORAL DAILY PRN
Status: DISCONTINUED | OUTPATIENT
Start: 2021-03-23 | End: 2021-03-25

## 2021-03-23 RX ORDER — ACETAMINOPHEN 325 MG/1
650 TABLET ORAL EVERY 6 HOURS PRN
Status: DISCONTINUED | OUTPATIENT
Start: 2021-03-23 | End: 2021-03-25

## 2021-03-23 RX ORDER — ONDANSETRON 2 MG/ML
8 INJECTION INTRAMUSCULAR; INTRAVENOUS EVERY 6 HOURS PRN
Status: DISCONTINUED | OUTPATIENT
Start: 2021-03-23 | End: 2021-03-25

## 2021-03-23 RX ORDER — MELATONIN
3 NIGHTLY PRN
Status: DISCONTINUED | OUTPATIENT
Start: 2021-03-23 | End: 2021-03-25

## 2021-03-23 NOTE — PLAN OF CARE
Per Dr. Anahi Morales, this patient follows [facility] urology. Md advised, consult placed for [facility] urology, spoke with [facility] & Dr. Stephani Pathak. Instructed patient on plan of care & to call if any asst needed, discomfort felt. Verbalized understanding.

## 2021-03-23 NOTE — H&P
[facility]  History and Physical     jose Osborn Patient Status:  Emergency    6/15/1990 MRN [id number]   Location [address] [facility] Attending Gloria Nelson MD   Hosp Day # 0 PCP Dayo Pradhan MD     Chief Complai Equal pulses. Chest and Back: No tenderness or deformity. Abdomen: Soft, tender LLQ, nondistended. Positive bowel sounds. No rebound, guarding or organomegaly. Neurologic: No focal neurological deficits. CNII-XII grossly intact.   Musculoskeletal: Move

## 2021-03-23 NOTE — ED PROVIDER NOTES
Patient Seen in: [facility] Emergency Department      History   Patient presents with:  Nausea/Vomiting/Diarrhea  Abdomen/Flank Pain  Urinary Symptoms    Stated Complaint: abdominal pain, urination blood, vomiting    HPI/Subjective:   HPI    Patient Exam    GENERAL: No acute distress, well appearing and non-toxic, Alert and oriented X 3   HEENT: Normocephalic, atraumatic. Moist mucous membranes.   Pupils equal round reactive to light and accommodation, extraocular motion is intact, sclerae white, conj Absolute 0.84 (*)     Monocyte Absolute 1.73 (*)     All other components within normal limits   CBC WITH DIFFERENTIAL WITH PLATELET    Narrative: The following orders were created for panel order CBC WITH DIFFERENTIAL WITH PLATELET.   Procedure Again noted is probable medullary nephrocalcinosis. The left kidney appears enlarged and demonstrates some mild inflammatory changes.   No librado hydronephrosis is present although there is a cystic area of the posterior aspect of the left kidney measuring abscess. Lesion kidney larger than on previous CAT scan could be simple cyst or abscess per radiologist.  Given broad-spectrum antibiotics and pancultured. Will be admitted to the hospital.  Labs reveal mild hyponatremia, leukocytosis and hypokalemia.   P

## 2021-03-23 NOTE — ED NOTES
[facility] Alberto Courtney to return ED RN call for report.  Pt to be admitted to [facility] room 332

## 2021-03-23 NOTE — PLAN OF CARE
NURSING ADMISSION NOTE      Patient admitted via bed. Oriented to room. Safety precautions initiated. Bed in low position. Call light in reach. Denies pain, states left side abdominal pressure. Denies discomfort or urgency when urinating.   In

## 2021-03-24 ENCOUNTER — APPOINTMENT (OUTPATIENT)
Dept: CT IMAGING | Facility: HOSPITAL | Age: 31
DRG: 690 | End: 2021-03-24
Attending: INTERNAL MEDICINE
Payer: MEDICAID

## 2021-03-24 LAB
ANION GAP SERPL CALC-SCNC: 3 MMOL/L (ref 0–18)
BASOPHILS # BLD AUTO: 0.03 X10(3) UL (ref 0–0.2)
BASOPHILS NFR BLD AUTO: 0.3 %
BUN BLD-MCNC: 11 MG/DL (ref 7–18)
BUN/CREAT SERPL: 12.6 (ref 10–20)
CALCIUM BLD-MCNC: 8.1 MG/DL (ref 8.5–10.1)
CHLORIDE SERPL-SCNC: 109 MMOL/L (ref 98–112)
CO2 SERPL-SCNC: 26 MMOL/L (ref 21–32)
CREAT BLD-MCNC: 0.87 MG/DL
DEPRECATED RDW RBC AUTO: 45.6 FL (ref 35.1–46.3)
EOSINOPHIL # BLD AUTO: 0 X10(3) UL (ref 0–0.7)
EOSINOPHIL NFR BLD AUTO: 0 %
ERYTHROCYTE [DISTWIDTH] IN BLOOD BY AUTOMATED COUNT: 13.4 % (ref 11–15)
GLUCOSE BLD-MCNC: 107 MG/DL (ref 70–99)
HAV IGM SER QL: 2.1 MG/DL (ref 1.6–2.6)
HCT VFR BLD AUTO: 36.2 %
HGB BLD-MCNC: 12.3 G/DL
IMM GRANULOCYTES # BLD AUTO: 0.04 X10(3) UL (ref 0–1)
IMM GRANULOCYTES NFR BLD: 0.4 %
LYMPHOCYTES # BLD AUTO: 1.08 X10(3) UL (ref 1–4)
LYMPHOCYTES NFR BLD AUTO: 10.7 %
MCH RBC QN AUTO: 31.4 PG (ref 26–34)
MCHC RBC AUTO-ENTMCNC: 34 G/DL (ref 31–37)
MCV RBC AUTO: 92.3 FL
MONOCYTES # BLD AUTO: 1.15 X10(3) UL (ref 0.1–1)
MONOCYTES NFR BLD AUTO: 11.4 %
NEUTROPHILS # BLD AUTO: 7.83 X10 (3) UL (ref 1.5–7.7)
NEUTROPHILS # BLD AUTO: 7.83 X10(3) UL (ref 1.5–7.7)
NEUTROPHILS NFR BLD AUTO: 77.2 %
OSMOLALITY SERPL CALC.SUM OF ELEC: 286 MOSM/KG (ref 275–295)
PLATELET # BLD AUTO: 215 10(3)UL (ref 150–450)
POTASSIUM SERPL-SCNC: 3.7 MMOL/L (ref 3.5–5.1)
RBC # BLD AUTO: 3.92 X10(6)UL
SODIUM SERPL-SCNC: 138 MMOL/L (ref 136–145)
WBC # BLD AUTO: 10.1 X10(3) UL (ref 4–11)

## 2021-03-24 PROCEDURE — 74177 CT ABD & PELVIS W/CONTRAST: CPT | Performed by: INTERNAL MEDICINE

## 2021-03-24 PROCEDURE — 99232 SBSQ HOSP IP/OBS MODERATE 35: CPT | Performed by: INTERNAL MEDICINE

## 2021-03-24 RX ORDER — CEFAZOLIN SODIUM/WATER 2 G/20 ML
2 SYRINGE (ML) INTRAVENOUS EVERY 8 HOURS
Status: DISCONTINUED | OUTPATIENT
Start: 2021-03-24 | End: 2021-03-25

## 2021-03-24 RX ORDER — SODIUM CHLORIDE 9 MG/ML
INJECTION, SOLUTION INTRAVENOUS CONTINUOUS
Status: DISCONTINUED | OUTPATIENT
Start: 2021-03-24 | End: 2021-03-25

## 2021-03-24 NOTE — PAYOR COMM NOTE
--------------  ADMISSION REVIEW     Payor: José Miguel Baeza #:  [id number]  Authorization Number: [id number]    Admit date: 3/23/21  Admit time:  1:50 PM     Patient Seen in: [facility] Emergency Department    His motion is intact, sclerae white, conjunctiva is pink. Oropharynx is unremarkable, no exudate. NECK: Supple, trachea midline, no lymphadenopathy. LUNG: Lungs clear to auscultation bilaterally, no wheezing, no rales, no rhonchi.   CARDIOVASCULAR: Regular 3.4.  White blood cell count 18.8. Urinalysis positive for infection     CT ABDOMEN+PELVIS KIDNEYSTONE 2D RND   The left kidney appears slightly enlarged asymmetrically and demonstrates some mild inflammatory changes. No librado hydronephrosis.   A cystic a 13.5   MCV 90.1   .0     *   BUN 12   CREATSERUM 1.01   GFRAA 87   GFRNAA 75   CA 8.9   ALB 3.1*   *   K 3.4*      CO2 26.0   ALKPHO 87   AST 11*   ALT 23   BILT 0.7   TP 7.8     ASSESSMENT / PLAN:     1.  Pyelonephritis-empiric ab RN      Piperacillin Sod-Tazobactam So (ZOSYN) 3.375 g in dextrose 5% 100 mL IVPB-ADDV     Date Action Dose Route User    3/23/2021 1030 New Bag 3.375 g Intravenous Miladis Sommer RN      0.9% NaCl infusion     Date Action Dose Route User    3/23/2021

## 2021-03-24 NOTE — PROGRESS NOTES
[facility]  Progress Note     Pascual Odonnell Patient Status:  Inpatient    6/15/1990 MRN [id number]   [facility] 3NW-A Attending Chuck Delatorre MD   [address] Day # 1 PCP Tabatha Miranda MD     Chief Complaint: LLQ pain    S: Angelica Gupta cefTRIAXone  1 g Intravenous Q24H       ASSESSMENT / PLAN:     1. Pyelonephritis  1. empiric abx  2. monitor cultures, E Coli, awaiting sensitivities   3. prn pain meds  4. IVF  5. Lactic normal  6.  Patients sometimes have fevers despite appropriate abx co

## 2021-03-24 NOTE — PAYOR COMM NOTE
--------------  3/24 CONTINUED STAY REVIEW    Payor: [address] [place] [address]  Subscriber #:  [id number]  Authorization Number: [id number]       UROLOGY:  Reason for Consultation:  Pyelonephritis     History of Present Illness:  Deandre NICOLE and nontender without rebound or guarding. The bladder is non-palpable. BACK: Without CVA tenderness. SKIN: Without stigmata. NEUROLOGIC: Grossly intact. EXTREMITIES: Without edema.     Laboratory Data:        Lab Results   Component Value Date     coverage  2. Hyponatremia-monitor on fluids  3.  Hypokalemia-per protocol     Plan of care: await cultures and monitor fever curve      MEDICATIONS ADMINISTERED IN LAST 1 DAY:  acetaminophen (TYLENOL) tab 650 mg     Date Action Dose Route User    3/24/2021

## 2021-03-24 NOTE — CONSULTS
[facility] Urology   Consultation Note    Sean Desai Patient Status:  Inpatient    6/15/1990 MRN [id number]   [facility] 3NW-A Attending Charles Elizabeth MD   [facility] Day # 1 PCP Willy De Jesus MD     Reason for Consultation: Lung    • Diabetes Maternal Grandfather    • Hypertension Maternal Grandfather       reports that she has never smoked. She has never used smokeless tobacco. She reports that she does not drink alcohol and does not use drugs.     Allergies:  No Known Allerg ALB 3.1 03/23/2021    ALKPHO 87 03/23/2021    BILT 0.7 03/23/2021    TP 7.8 03/23/2021    AST 11 03/23/2021    ALT 23 03/23/2021    LIP 70 03/23/2021    MG 2.1 03/24/2021     UA 3/23/21: nitrite positive, >50 WBC/hpf, >10 RBC/hpf, 2+ bacteria, few squamous  Consider correlation for pyelonephritis.       Findings suggestive of medullary nephrocalcinosis.        Impression:  Patient Active Problem List:     Left ureteral stone     Medullary sponge kidney of both kidneys     Nephrocalcinosis     Pyelonephritis

## 2021-03-24 NOTE — PLAN OF CARE
Pt A&Ox4. RA. Pt denies any shortness of breath, chest pain, and calf pain. Pt tolerating regular diet. Voiding clear yellow urine. IV abx and IVF per order. Pt spiked a fever of 102.7 at 1451, Tylenol given hospitalst aware.  ID consulted and abx was vargas activity based on assessment  - Modify environment to reduce risk of injury  - Provide assistive devices as appropriate  - Consider OT/PT consult to assist with strengthening/mobility  - Encourage toileting schedule  Outcome: Progressing

## 2021-03-24 NOTE — CONSULTS
INFECTIOUS DISEASE CONSULTATION    [place] Patient Status:  Inpatient    6/15/1990 MRN [id number]   [facility] 3NW-A Attending Shena Molina MD   [facility] Day # 1 PCP Apurva Marinelli Daily PRN  •  ondansetron HCl (ZOFRAN) injection 8 mg, 8 mg, Intravenous, Q6H PRN  •  Ketorolac Tromethamine (TORADOL) injection 15 mg, 15 mg, Intravenous, Q6H PRN  No current facility-administered medications on file prior to encounter.   No current outpat Specimen: Urine, clean catch   Result Value Ref Range    Urine Culture >100,000 CFU/ML Escherichia coli (A) N/A       Susceptibility    Escherichia coli -  (no method available)     Ampicillin <=2 Sensitive      Cefazolin <=4 Sensitive      Ciprofloxacin <

## 2021-03-24 NOTE — PLAN OF CARE
Patient A&Ox4, denies pain this shift. 2.5L bolus complete, order obtained from MD Ailyn Hartman to start NS @100ml. Lactic acid level 1.2 after bolus administration. IV abx initiated this shift. VSS. Tmax 101.2.  Telemetry monitoring continued, patient has activity based on assessment  - Modify environment to reduce risk of injury  - Provide assistive devices as appropriate  - Consider OT/PT consult to assist with strengthening/mobility  - Encourage toileting schedule  Outcome: Progressing

## 2021-03-24 NOTE — PLAN OF CARE
Elevated temp. Physician notified, orders rec'd, labs drawn, ivf started. Next shift will cont with current plan of care. Instructed on plan of care, call for all asst needed, discomfort felt. Verbalized understanding.

## 2021-03-25 VITALS
RESPIRATION RATE: 18 BRPM | HEART RATE: 79 BPM | SYSTOLIC BLOOD PRESSURE: 103 MMHG | BODY MASS INDEX: 33 KG/M2 | TEMPERATURE: 99 F | WEIGHT: 174 LBS | DIASTOLIC BLOOD PRESSURE: 64 MMHG | OXYGEN SATURATION: 100 %

## 2021-03-25 PROCEDURE — 99239 HOSP IP/OBS DSCHRG MGMT >30: CPT | Performed by: INTERNAL MEDICINE

## 2021-03-25 RX ORDER — LEVOFLOXACIN 500 MG/1
500 TABLET, FILM COATED ORAL DAILY
Qty: 24 TABLET | Refills: 0 | Status: SHIPPED | OUTPATIENT
Start: 2021-03-25 | End: 2021-04-18

## 2021-03-25 NOTE — PROGRESS NOTES
[facility]  Urology Progress Note    Misty Reinier Patient Status:  Inpatient    6/15/1990 MRN [id number]   [facility] 3NW-A Attending Shena Molina MD   [facility] Day # 2 PCP Alina Tucker MD     Subjective:  [place] is a measuring 10 x 8 mm most consistent with   a cyst.  Within the left midpole posteriorly there is a nonenhancing low-attenuation lesion measuring 19 x 15 mm most consistent with a cyst.  Within the medial midpole there is an ill-defined hypo attenuating les to the infection.  There is no   drainable abscess at this time.       3. Multiple bilateral kidney stones consistent with medullary sponge kidney.       4.  The IUD is positioned within the lower uterine segment and evaluation by gynecology would be recomm

## 2021-03-25 NOTE — PAYOR COMM NOTE
--------------  CONTINUED STAY REVIEW    Payor: José Miguel Baeza #:  [id number]  Authorization Number: [id number]    Admit date: 3/23/21  Admit time:  1:50 PM    Admitting Physician: Anabel Watters MD  Attending Physic Given 2 g Intravenous [facility]    3/24/2021 1700 Given 2 g Intravenous Katherin Marquez, LEANA      cefTRIAXone Sodium (ROCEPHIN) 1 g in sodium chloride 0.9% 100 mL IVPB-ADDV     Date Action Dose Route User    3/24/2021 1700 New Bag 1 g Intravenous [place]

## 2021-03-25 NOTE — PROGRESS NOTES
[facility]                INFECTIOUS DISEASE PROGRESS NOTE    Nisreen Quinteros Patient Status:  Inpatient    6/15/1990 MRN [id number]   [facility] 3NW-A Attending Elisa Branham MD   Hosp Day # 2 PCP Tori Harden MD     Anti on 03/23/21   1.  BLOOD CULTURE     Status: None (Preliminary result)    Collection Time: 03/23/21  7:35 PM    Specimen: Blood,peripheral   Result Value Ref Range    Blood Culture Result No Growth 1 Day N/A   2. URINE CULTURE, ROUTINE     Status: Abnormal

## 2021-03-25 NOTE — PLAN OF CARE
Patient A&Ox4, VSS. Low grade temp overnight Tmax 99.3F.  and telemetry monitoring continued patient has been NSR on the monitor. Pain managed with prn medication. IVF and abx continued. Questions and concerns addressed, none further at this time.    Pro injury  - Provide assistive devices as appropriate  - Consider OT/PT consult to assist with strengthening/mobility  - Encourage toileting schedule  Outcome: Progressing

## 2021-03-25 NOTE — PLAN OF CARE
NURSING DISCHARGE NOTE      Pt A&Ox4. Discharge instructions given. All questions answered to pt satisfaction. IVs removed and intact. Discharged Home via Wheelchair.   Accompanied by Family member and Support staff  Belongings Taken by [employer]

## 2021-03-25 NOTE — DISCHARGE SUMMARY
[facility]  DISCHARGE SUMMARY     [facility] Patient Status:  Inpatient    6/15/1990 MRN [id number]   [facility] 3NW-A Attending Alona Perez MD   [facility] Day # 2 PCP Paul Pryor MD     Date of Admission: 3/23/2021  Date Days 3/26/2021 - 4/25/2021 Comment        Date Arrival Time Visit Type Length Department Provider     4/2/2021 10:30 AM  NON-TCM HOSPITAL FOLLOW UP [5060] 30 min Butch MD Amador    Patient Instructions:

## 2021-03-25 NOTE — PROGRESS NOTES
[facility]  Progress Note     Traci Petersen Patient Status:  Inpatient    6/15/1990 MRN [id number]   [facility] 3NW-A Attending Bharti Mendez MD   Hosp Day # 2 PCP Nilsa Maxwell MD     Chief Complaint: LLQ pain    S: Enoch Diana 2 g Intravenous Q8H       ASSESSMENT / PLAN:     1. Pyelonephritis  1. empiric abx  2. monitor cultures, E Coli pansensitive   3. prn pain meds  4. IVF  5. Lactic normal  2. Hyponatremia   3.  Hypokalemia-per protocol    Plan of care: await ID JOHN angel plan

## 2021-03-25 NOTE — PLAN OF CARE
Pt A&Ox4. RA. NSR. Pt denies shortness of breath, chest pain or calf pain. 6843: Pt temp 98.6. Pt c/o of HA, tylenol given. Pt voiding clear/ yellow urine. Safety precautions in place, call light in reach.        Problem: Patient/Family Goals  Goal: Patient appropriate  - Consider OT/PT consult to assist with strengthening/mobility  - Encourage toileting schedule  Outcome: Progressing

## 2021-03-26 ENCOUNTER — PATIENT OUTREACH (OUTPATIENT)
Dept: CASE MANAGEMENT | Age: 31
End: 2021-03-26

## 2021-03-26 DIAGNOSIS — N12 PYELONEPHRITIS: ICD-10-CM

## 2021-03-26 DIAGNOSIS — Z02.9 ENCOUNTERS FOR ADMINISTRATIVE PURPOSE: ICD-10-CM

## 2021-03-26 PROCEDURE — 1111F DSCHRG MED/CURRENT MED MERGE: CPT

## 2021-03-26 NOTE — PROGRESS NOTES
Initial Post Discharge Follow Up   Discharge Date: 3/25/21  Contact Date: 3/26/2021    Consent Verification:  Assessment Completed With: Patient  HIPAA Verified?   Yes    Discharge Dx:  Pyelonephritis    Was TCC ordered: no      General:   • How have you about your new medication? No  • Did you  your discharge medications when you left the hospital? Yes  • May I go over your medications with you to make sure we are not missing anything? yes  • Are there any reasons that keep you from taking your medi

## 2021-03-26 NOTE — PAYOR COMM NOTE
--------------  DISCHARGE REVIEW    Payor: José Miguel Baeza #:  [id number]  Authorization Number: [id number]    Admit date: 3/23/21  Admit time:   1:50 PM  Discharge Date: 3/25/2021  5:00 PM     Admitting Physician: Dano Rolon

## 2021-05-11 ENCOUNTER — OFFICE VISIT (OUTPATIENT)
Dept: FAMILY MEDICINE CLINIC | Facility: CLINIC | Age: 31
End: 2021-05-11
Payer: MEDICAID

## 2021-05-11 ENCOUNTER — LABORATORY ENCOUNTER (OUTPATIENT)
Dept: LAB | Age: 31
End: 2021-05-11
Attending: FAMILY MEDICINE
Payer: MEDICAID

## 2021-05-11 VITALS
DIASTOLIC BLOOD PRESSURE: 84 MMHG | RESPIRATION RATE: 16 BRPM | WEIGHT: 169 LBS | HEIGHT: 61 IN | OXYGEN SATURATION: 98 % | HEART RATE: 84 BPM | BODY MASS INDEX: 31.91 KG/M2 | SYSTOLIC BLOOD PRESSURE: 126 MMHG

## 2021-05-11 DIAGNOSIS — Z41.9 SURGERY, ELECTIVE: ICD-10-CM

## 2021-05-11 DIAGNOSIS — Z01.818 PREOPERATIVE CLEARANCE: ICD-10-CM

## 2021-05-11 DIAGNOSIS — Z01.818 PREOPERATIVE CLEARANCE: Primary | ICD-10-CM

## 2021-05-11 PROBLEM — N12 PYELONEPHRITIS: Status: RESOLVED | Noted: 2021-03-23 | Resolved: 2021-05-11

## 2021-05-11 PROCEDURE — 36415 COLL VENOUS BLD VENIPUNCTURE: CPT

## 2021-05-11 PROCEDURE — 84702 CHORIONIC GONADOTROPIN TEST: CPT

## 2021-05-11 PROCEDURE — 99213 OFFICE O/P EST LOW 20 MIN: CPT | Performed by: FAMILY MEDICINE

## 2021-05-11 PROCEDURE — 3074F SYST BP LT 130 MM HG: CPT | Performed by: FAMILY MEDICINE

## 2021-05-11 PROCEDURE — 87389 HIV-1 AG W/HIV-1&-2 AB AG IA: CPT

## 2021-05-11 PROCEDURE — 85025 COMPLETE CBC W/AUTO DIFF WBC: CPT

## 2021-05-11 PROCEDURE — 3079F DIAST BP 80-89 MM HG: CPT | Performed by: FAMILY MEDICINE

## 2021-05-11 PROCEDURE — 80053 COMPREHEN METABOLIC PANEL: CPT

## 2021-05-11 PROCEDURE — 3008F BODY MASS INDEX DOCD: CPT | Performed by: FAMILY MEDICINE

## 2021-05-11 PROCEDURE — 85610 PROTHROMBIN TIME: CPT

## 2021-05-11 NOTE — PROGRESS NOTES
Tracie Patino is a 35year old female who presents for a pre-operative physical exam.   Tracie Patino is scheduled for a liiposuction procedure at [address] [place] [address] on 6/9/21 performed by Dr Dr. Daxa Enciso.  Indication: voluntary    HPI related to (Quant Pregnancy Test) [E]      COVID-19 Testing by PCR ([facility]) [E]        Miguel Johnson MD

## 2021-05-13 ENCOUNTER — TELEPHONE (OUTPATIENT)
Dept: FAMILY MEDICINE CLINIC | Facility: CLINIC | Age: 31
End: 2021-05-13

## 2021-05-13 DIAGNOSIS — Z41.9 SURGERY, ELECTIVE: ICD-10-CM

## 2021-05-13 DIAGNOSIS — Z01.818 PREOPERATIVE CLEARANCE: Primary | ICD-10-CM

## 2021-05-13 NOTE — TELEPHONE ENCOUNTER
PTT not done. Pended order if agreeable. OV specified Silver Pedro has no significant history of cardiac or pulmonary conditions. She is a good surgical candidate. This consult was sent back the referring physician, Dr. Pamela Proctor. \"  These notes we

## 2021-05-13 NOTE — TELEPHONE ENCOUNTER
I signed off on the PTT as Dr. Isabella Rodrigues is out of town the next few days. I agree that his note appears to be a surgical clearance.   Have they not received his preoperative history and physical exam?

## 2021-05-13 NOTE — TELEPHONE ENCOUNTER
Pt said surgeon received some labs but needs the PTT sent there as well as the clearance note. Pt asking to be advised if the PTT was not done as it will need to be done.   Fax this info to Dr. Edna Salvador at [phone]

## 2021-05-17 ENCOUNTER — LAB ENCOUNTER (OUTPATIENT)
Dept: LAB | Age: 31
End: 2021-05-17
Attending: FAMILY MEDICINE
Payer: MEDICAID

## 2021-05-17 DIAGNOSIS — Z01.818 PREOPERATIVE CLEARANCE: ICD-10-CM

## 2021-05-17 DIAGNOSIS — Z41.9 SURGERY, ELECTIVE: ICD-10-CM

## 2021-05-17 PROCEDURE — 85730 THROMBOPLASTIN TIME PARTIAL: CPT

## 2021-05-17 PROCEDURE — 36415 COLL VENOUS BLD VENIPUNCTURE: CPT

## 2021-05-18 NOTE — TELEPHONE ENCOUNTER
Fax recd from Dr. Dipak Borrego office requesting medical clearance form to be completed. They also need PTT result sent to them as well. Patient had pre-op on 5/11. Fax # [phone]. [facility].

## 2021-05-18 NOTE — TELEPHONE ENCOUNTER
Form completed except for provider sig. Placed in [facility] triage folder and will fax once signed. Pt sx scheduled for 6/9/21. RH out of office until 5/20/21.

## 2021-05-18 NOTE — TELEPHONE ENCOUNTER
Dr Nila Marcelo, please advise on PTT. Also, we have not rec'd form that pt is referring to. Will review once we receive it and see what is needed.

## 2021-05-18 NOTE — TELEPHONE ENCOUNTER
Patient called to check status of her clearance form and her PTT. Explained PTT has not been reviewed yet and we can fax it once it has been. She will be faxing a form to the office for clearance.

## 2021-05-19 ENCOUNTER — TELEPHONE (OUTPATIENT)
Dept: FAMILY MEDICINE CLINIC | Facility: CLINIC | Age: 31
End: 2021-05-19

## 2021-05-19 NOTE — TELEPHONE ENCOUNTER
PTT low. Concerned about bleeding out from surgery when the level is elevated. Okay to proceed with surgery. Ta Mccullough MD covering for Bo Pierre

## 2021-05-26 ENCOUNTER — LAB ENCOUNTER (OUTPATIENT)
Dept: LAB | Facility: HOSPITAL | Age: 31
End: 2021-05-26
Attending: FAMILY MEDICINE
Payer: MEDICAID

## 2021-05-26 DIAGNOSIS — Z01.818 PREOPERATIVE CLEARANCE: ICD-10-CM

## 2021-05-26 DIAGNOSIS — Z41.9 SURGERY, ELECTIVE: ICD-10-CM

## 2021-06-07 ENCOUNTER — TELEPHONE (OUTPATIENT)
Dept: FAMILY MEDICINE CLINIC | Facility: CLINIC | Age: 31
End: 2021-06-07

## 2021-06-07 DIAGNOSIS — Z29.9 DVT PROPHYLAXIS: ICD-10-CM

## 2021-06-07 DIAGNOSIS — Z41.9 SURGERY, ELECTIVE: Primary | ICD-10-CM

## 2021-06-07 DIAGNOSIS — G89.18 POSTOPERATIVE PAIN: ICD-10-CM

## 2021-06-07 RX ORDER — OXYCODONE HYDROCHLORIDE AND ACETAMINOPHEN 5; 325 MG/1; MG/1
1 TABLET ORAL EVERY 4 HOURS PRN
Qty: 30 TABLET | Refills: 0 | Status: SHIPPED | OUTPATIENT
Start: 2021-06-07 | End: 2021-08-23 | Stop reason: CLARIF

## 2021-06-07 RX ORDER — ENOXAPARIN SODIUM 100 MG/ML
40 INJECTION SUBCUTANEOUS DAILY
Qty: 4 ML | Refills: 0 | Status: SHIPPED | OUTPATIENT
Start: 2021-06-07 | End: 2021-07-18

## 2021-06-07 RX ORDER — CEPHALEXIN 500 MG/1
500 TABLET ORAL 2 TIMES DAILY
Qty: 14 TABLET | Refills: 0 | Status: SHIPPED | OUTPATIENT
Start: 2021-06-07 | End: 2021-07-18

## 2021-06-07 RX ORDER — ONDANSETRON 4 MG/1
4 TABLET, FILM COATED ORAL EVERY 8 HOURS PRN
Qty: 20 TABLET | Refills: 1 | Status: SHIPPED | OUTPATIENT
Start: 2021-06-07 | End: 2021-07-18

## 2021-06-07 NOTE — TELEPHONE ENCOUNTER
Pt was told by her surgeon to call primary to prescribed zofran, keflex, percoset and lovenox before her surgery. No other info provided.   Pls call to discuss

## 2021-06-07 NOTE — TELEPHONE ENCOUNTER
We had discussed this, since the surgery is in [place], and she will be there only a short period of time, discussed I may need to prescribe.     I didn't realize about the lovenox though, so I'm not sure what the evidence is for how dvt prophylaxis is need

## 2021-07-17 PROCEDURE — 36415 COLL VENOUS BLD VENIPUNCTURE: CPT

## 2021-07-17 PROCEDURE — 99283 EMERGENCY DEPT VISIT LOW MDM: CPT

## 2021-07-18 ENCOUNTER — HOSPITAL ENCOUNTER (EMERGENCY)
Facility: HOSPITAL | Age: 31
Discharge: HOME | End: 2021-07-18
Attending: EMERGENCY MEDICINE
Payer: MEDICAID

## 2021-07-18 VITALS
WEIGHT: 170 LBS | HEIGHT: 61 IN | HEART RATE: 72 BPM | OXYGEN SATURATION: 98 % | BODY MASS INDEX: 32.1 KG/M2 | TEMPERATURE: 98 F | SYSTOLIC BLOOD PRESSURE: 116 MMHG | RESPIRATION RATE: 18 BRPM | DIASTOLIC BLOOD PRESSURE: 50 MMHG

## 2021-07-18 DIAGNOSIS — N39.0 URINARY TRACT INFECTION WITH HEMATURIA, SITE UNSPECIFIED: Primary | ICD-10-CM

## 2021-07-18 DIAGNOSIS — R31.9 URINARY TRACT INFECTION WITH HEMATURIA, SITE UNSPECIFIED: Primary | ICD-10-CM

## 2021-07-18 LAB
ALBUMIN SERPL-MCNC: 3.3 G/DL (ref 3.4–5)
ALBUMIN/GLOB SERPL: 0.9 {RATIO} (ref 1–2)
ALP LIVER SERPL-CCNC: 75 U/L
ALT SERPL-CCNC: 25 U/L
ANION GAP SERPL CALC-SCNC: 7 MMOL/L (ref 0–18)
AST SERPL-CCNC: 20 U/L (ref 15–37)
B-HCG UR QL: NEGATIVE
BASOPHILS # BLD AUTO: 0.05 X10(3) UL (ref 0–0.2)
BASOPHILS NFR BLD AUTO: 0.7 %
BILIRUB SERPL-MCNC: 0.3 MG/DL (ref 0.1–2)
BILIRUB UR QL STRIP.AUTO: NEGATIVE
BUN BLD-MCNC: 12 MG/DL (ref 7–18)
BUN/CREAT SERPL: 11.1 (ref 10–20)
CALCIUM BLD-MCNC: 8.6 MG/DL (ref 8.5–10.1)
CHLORIDE SERPL-SCNC: 108 MMOL/L (ref 98–112)
CO2 SERPL-SCNC: 25 MMOL/L (ref 21–32)
COLOR UR AUTO: YELLOW
CREAT BLD-MCNC: 1.08 MG/DL
DEPRECATED RDW RBC AUTO: 47.2 FL (ref 35.1–46.3)
EOSINOPHIL # BLD AUTO: 0.27 X10(3) UL (ref 0–0.7)
EOSINOPHIL NFR BLD AUTO: 3.5 %
ERYTHROCYTE [DISTWIDTH] IN BLOOD BY AUTOMATED COUNT: 13.6 % (ref 11–15)
GLOBULIN PLAS-MCNC: 3.7 G/DL (ref 2.8–4.4)
GLUCOSE BLD-MCNC: 111 MG/DL (ref 70–99)
GLUCOSE UR STRIP.AUTO-MCNC: NEGATIVE MG/DL
HCT VFR BLD AUTO: 39 %
HGB BLD-MCNC: 13 G/DL
IMM GRANULOCYTES # BLD AUTO: 0.02 X10(3) UL (ref 0–1)
IMM GRANULOCYTES NFR BLD: 0.3 %
KETONES UR STRIP.AUTO-MCNC: NEGATIVE MG/DL
LIPASE SERPL-CCNC: 90 U/L (ref 73–393)
LYMPHOCYTES # BLD AUTO: 1.91 X10(3) UL (ref 1–4)
LYMPHOCYTES NFR BLD AUTO: 25 %
M PROTEIN MFR SERPL ELPH: 7 G/DL (ref 6.4–8.2)
MCH RBC QN AUTO: 31.3 PG (ref 26–34)
MCHC RBC AUTO-ENTMCNC: 33.3 G/DL (ref 31–37)
MCV RBC AUTO: 94 FL
MONOCYTES # BLD AUTO: 0.57 X10(3) UL (ref 0.1–1)
MONOCYTES NFR BLD AUTO: 7.5 %
NEUTROPHILS # BLD AUTO: 4.83 X10 (3) UL (ref 1.5–7.7)
NEUTROPHILS # BLD AUTO: 4.83 X10(3) UL (ref 1.5–7.7)
NEUTROPHILS NFR BLD AUTO: 63 %
NITRITE UR QL STRIP.AUTO: NEGATIVE
OSMOLALITY SERPL CALC.SUM OF ELEC: 290 MOSM/KG (ref 275–295)
PH UR STRIP.AUTO: 6 [PH] (ref 5–8)
PLATELET # BLD AUTO: 281 10(3)UL (ref 150–450)
POTASSIUM SERPL-SCNC: 3.1 MMOL/L (ref 3.5–5.1)
PROT UR STRIP.AUTO-MCNC: NEGATIVE MG/DL
RBC # BLD AUTO: 4.15 X10(6)UL
SODIUM SERPL-SCNC: 140 MMOL/L (ref 136–145)
SP GR UR STRIP.AUTO: 1.02 (ref 1–1.03)
UROBILINOGEN UR STRIP.AUTO-MCNC: <2 MG/DL
WBC # BLD AUTO: 7.7 X10(3) UL (ref 4–11)

## 2021-07-18 PROCEDURE — 81025 URINE PREGNANCY TEST: CPT

## 2021-07-18 PROCEDURE — 85025 COMPLETE CBC W/AUTO DIFF WBC: CPT | Performed by: EMERGENCY MEDICINE

## 2021-07-18 PROCEDURE — 83690 ASSAY OF LIPASE: CPT

## 2021-07-18 PROCEDURE — 81001 URINALYSIS AUTO W/SCOPE: CPT | Performed by: EMERGENCY MEDICINE

## 2021-07-18 PROCEDURE — 83690 ASSAY OF LIPASE: CPT | Performed by: EMERGENCY MEDICINE

## 2021-07-18 PROCEDURE — 80053 COMPREHEN METABOLIC PANEL: CPT | Performed by: EMERGENCY MEDICINE

## 2021-07-18 PROCEDURE — 85025 COMPLETE CBC W/AUTO DIFF WBC: CPT

## 2021-07-18 PROCEDURE — 87086 URINE CULTURE/COLONY COUNT: CPT | Performed by: EMERGENCY MEDICINE

## 2021-07-18 PROCEDURE — 80053 COMPREHEN METABOLIC PANEL: CPT

## 2021-07-18 RX ORDER — CIPROFLOXACIN 500 MG/1
500 TABLET, FILM COATED ORAL 2 TIMES DAILY
Qty: 10 TABLET | Refills: 0 | Status: SHIPPED | OUTPATIENT
Start: 2021-07-18 | End: 2021-07-23

## 2021-07-18 RX ORDER — CIPROFLOXACIN 500 MG/1
500 TABLET, FILM COATED ORAL ONCE
Status: COMPLETED | OUTPATIENT
Start: 2021-07-18 | End: 2021-07-18

## 2021-07-18 RX ORDER — POTASSIUM CHLORIDE 20 MEQ/1
40 TABLET, EXTENDED RELEASE ORAL ONCE
Status: COMPLETED | OUTPATIENT
Start: 2021-07-18 | End: 2021-07-18

## 2021-07-18 NOTE — ED INITIAL ASSESSMENT (HPI)
Patient with c/o left flank pain that began 3 days ago, states pain is similar to when she had a kidney infection in the past.

## 2021-07-18 NOTE — ED PROVIDER NOTES
Patient Seen in: [facility] Emergency Department      History   Patient presents with:  Abdomen/Flank Pain    Stated Complaint: abdominal pain    HPI/Subjective:   HPI    26-year-old female presents ED with complaints of left-sided side pain.   Jose and atraumatic. Eyes:      Pupils: Pupils are equal, round, and reactive to light. Cardiovascular:      Rate and Rhythm: Normal rate and regular rhythm. Pulmonary:      Effort: Pulmonary effort is normal.      Breath sounds: Normal breath sounds.    A tests on the individual orders. URINE CULTURE, ROUTINE                   MDM      This is a 81-year-old female presents ED with complaints of left-sided side pain.   Vital signs stable arrival patient appears nontoxic examination abdomen soft nontender no

## 2021-08-23 ENCOUNTER — HOSPITAL ENCOUNTER (OUTPATIENT)
Facility: HOSPITAL | Age: 31
Setting detail: OBSERVATION
Discharge: HOME | DRG: 660 | End: 2021-08-25
Attending: EMERGENCY MEDICINE | Admitting: HOSPITALIST
Payer: MEDICAID

## 2021-08-23 ENCOUNTER — APPOINTMENT (OUTPATIENT)
Dept: CT IMAGING | Facility: HOSPITAL | Age: 31
DRG: 660 | End: 2021-08-23
Attending: EMERGENCY MEDICINE
Payer: MEDICAID

## 2021-08-23 DIAGNOSIS — N13.9 OBSTRUCTIVE UROPATHY: Primary | ICD-10-CM

## 2021-08-23 DIAGNOSIS — N20.1 CALCULI, URETER: ICD-10-CM

## 2021-08-23 DIAGNOSIS — R10.9 INTRACTABLE ABDOMINAL PAIN: ICD-10-CM

## 2021-08-23 DIAGNOSIS — N20.1 URETEROLITHIASIS: ICD-10-CM

## 2021-08-23 LAB
ALBUMIN SERPL-MCNC: 3.6 G/DL (ref 3.4–5)
ALBUMIN/GLOB SERPL: 0.9 {RATIO} (ref 1–2)
ALP LIVER SERPL-CCNC: 85 U/L
ALT SERPL-CCNC: 17 U/L
ANION GAP SERPL CALC-SCNC: 9 MMOL/L (ref 0–18)
AST SERPL-CCNC: 12 U/L (ref 15–37)
B-HCG UR QL: NEGATIVE
BASOPHILS # BLD AUTO: 0.06 X10(3) UL (ref 0–0.2)
BASOPHILS NFR BLD AUTO: 0.5 %
BILIRUB SERPL-MCNC: 0.4 MG/DL (ref 0.1–2)
BILIRUB UR QL STRIP.AUTO: NEGATIVE
BUN BLD-MCNC: 10 MG/DL (ref 7–18)
CALCIUM BLD-MCNC: 8.8 MG/DL (ref 8.5–10.1)
CHLORIDE SERPL-SCNC: 105 MMOL/L (ref 98–112)
CO2 SERPL-SCNC: 21 MMOL/L (ref 21–32)
COLOR UR AUTO: YELLOW
CREAT BLD-MCNC: 1.34 MG/DL
EOSINOPHIL # BLD AUTO: 0.19 X10(3) UL (ref 0–0.7)
EOSINOPHIL NFR BLD AUTO: 1.6 %
ERYTHROCYTE [DISTWIDTH] IN BLOOD BY AUTOMATED COUNT: 13.2 %
GLOBULIN PLAS-MCNC: 4 G/DL (ref 2.8–4.4)
GLUCOSE BLD-MCNC: 114 MG/DL (ref 70–99)
GLUCOSE UR STRIP.AUTO-MCNC: NEGATIVE MG/DL
HCG SERPL QL: NEGATIVE
HCT VFR BLD AUTO: 41 %
HGB BLD-MCNC: 14 G/DL
IMM GRANULOCYTES # BLD AUTO: 0.04 X10(3) UL (ref 0–1)
IMM GRANULOCYTES NFR BLD: 0.3 %
LEUKOCYTE ESTERASE UR QL STRIP.AUTO: NEGATIVE
LIPASE SERPL-CCNC: 307 U/L (ref 73–393)
LYMPHOCYTES # BLD AUTO: 1.45 X10(3) UL (ref 1–4)
LYMPHOCYTES NFR BLD AUTO: 12.4 %
M PROTEIN MFR SERPL ELPH: 7.6 G/DL (ref 6.4–8.2)
MCH RBC QN AUTO: 31.4 PG (ref 26–34)
MCHC RBC AUTO-ENTMCNC: 34.1 G/DL (ref 31–37)
MCV RBC AUTO: 91.9 FL
MONOCYTES # BLD AUTO: 0.58 X10(3) UL (ref 0.1–1)
MONOCYTES NFR BLD AUTO: 5 %
NEUTROPHILS # BLD AUTO: 9.38 X10 (3) UL (ref 1.5–7.7)
NEUTROPHILS # BLD AUTO: 9.38 X10(3) UL (ref 1.5–7.7)
NEUTROPHILS NFR BLD AUTO: 80.2 %
NITRITE UR QL STRIP.AUTO: NEGATIVE
OSMOLALITY SERPL CALC.SUM OF ELEC: 280 MOSM/KG (ref 275–295)
PH UR STRIP.AUTO: 8 [PH] (ref 5–8)
PLATELET # BLD AUTO: 302 10(3)UL (ref 150–450)
POTASSIUM SERPL-SCNC: 3.4 MMOL/L (ref 3.5–5.1)
PROT UR STRIP.AUTO-MCNC: NEGATIVE MG/DL
RBC # BLD AUTO: 4.46 X10(6)UL
RBC UR QL AUTO: NEGATIVE
SARS-COV-2 RNA RESP QL NAA+PROBE: NOT DETECTED
SODIUM SERPL-SCNC: 135 MMOL/L (ref 136–145)
SP GR UR STRIP.AUTO: 1.01 (ref 1–1.03)
UROBILINOGEN UR STRIP.AUTO-MCNC: <2 MG/DL
WBC # BLD AUTO: 11.7 X10(3) UL (ref 4–11)

## 2021-08-23 PROCEDURE — 99223 1ST HOSP IP/OBS HIGH 75: CPT | Performed by: HOSPITALIST

## 2021-08-23 PROCEDURE — 74176 CT ABD & PELVIS W/O CONTRAST: CPT | Performed by: EMERGENCY MEDICINE

## 2021-08-23 RX ORDER — SODIUM CHLORIDE 9 MG/ML
INJECTION, SOLUTION INTRAVENOUS CONTINUOUS
Status: ACTIVE | OUTPATIENT
Start: 2021-08-23 | End: 2021-08-24

## 2021-08-23 RX ORDER — ONDANSETRON 2 MG/ML
4 INJECTION INTRAMUSCULAR; INTRAVENOUS ONCE
Status: COMPLETED | OUTPATIENT
Start: 2021-08-23 | End: 2021-08-23

## 2021-08-23 RX ORDER — ONDANSETRON 2 MG/ML
4 INJECTION INTRAMUSCULAR; INTRAVENOUS EVERY 4 HOURS PRN
Status: ACTIVE | OUTPATIENT
Start: 2021-08-23 | End: 2021-08-24

## 2021-08-23 RX ORDER — MORPHINE SULFATE 2 MG/ML
1 INJECTION, SOLUTION INTRAMUSCULAR; INTRAVENOUS EVERY 2 HOUR PRN
Status: DISCONTINUED | OUTPATIENT
Start: 2021-08-23 | End: 2021-08-25

## 2021-08-23 RX ORDER — SODIUM CHLORIDE 9 MG/ML
INJECTION, SOLUTION INTRAVENOUS CONTINUOUS
Status: DISCONTINUED | OUTPATIENT
Start: 2021-08-23 | End: 2021-08-25

## 2021-08-23 RX ORDER — MORPHINE SULFATE 4 MG/ML
4 INJECTION, SOLUTION INTRAMUSCULAR; INTRAVENOUS EVERY 2 HOUR PRN
Status: DISCONTINUED | OUTPATIENT
Start: 2021-08-23 | End: 2021-08-25

## 2021-08-23 RX ORDER — HYDROMORPHONE HYDROCHLORIDE 1 MG/ML
0.5 INJECTION, SOLUTION INTRAMUSCULAR; INTRAVENOUS; SUBCUTANEOUS EVERY 30 MIN PRN
Status: ACTIVE | OUTPATIENT
Start: 2021-08-23 | End: 2021-08-24

## 2021-08-23 RX ORDER — IBUPROFEN 200 MG
400 TABLET ORAL EVERY 6 HOURS PRN
Status: ON HOLD | COMMUNITY
End: 2021-08-24

## 2021-08-23 RX ORDER — ONDANSETRON 2 MG/ML
4 INJECTION INTRAMUSCULAR; INTRAVENOUS EVERY 6 HOURS PRN
Status: DISCONTINUED | OUTPATIENT
Start: 2021-08-23 | End: 2021-08-25

## 2021-08-23 RX ORDER — ACETAMINOPHEN 325 MG/1
650 TABLET ORAL EVERY 6 HOURS PRN
Status: DISCONTINUED | OUTPATIENT
Start: 2021-08-23 | End: 2021-08-25

## 2021-08-23 RX ORDER — KETOROLAC TROMETHAMINE 30 MG/ML
15 INJECTION, SOLUTION INTRAMUSCULAR; INTRAVENOUS ONCE
Status: COMPLETED | OUTPATIENT
Start: 2021-08-23 | End: 2021-08-23

## 2021-08-23 RX ORDER — HEPARIN SODIUM 5000 [USP'U]/ML
5000 INJECTION, SOLUTION INTRAVENOUS; SUBCUTANEOUS EVERY 12 HOURS SCHEDULED
Status: DISCONTINUED | OUTPATIENT
Start: 2021-08-23 | End: 2021-08-25

## 2021-08-23 RX ORDER — PROCHLORPERAZINE EDISYLATE 5 MG/ML
5 INJECTION INTRAMUSCULAR; INTRAVENOUS EVERY 8 HOURS PRN
Status: DISCONTINUED | OUTPATIENT
Start: 2021-08-23 | End: 2021-08-25

## 2021-08-23 RX ORDER — MORPHINE SULFATE 2 MG/ML
2 INJECTION, SOLUTION INTRAMUSCULAR; INTRAVENOUS EVERY 2 HOUR PRN
Status: DISCONTINUED | OUTPATIENT
Start: 2021-08-23 | End: 2021-08-25

## 2021-08-24 ENCOUNTER — ANESTHESIA EVENT (OUTPATIENT)
Dept: SURGERY | Facility: HOSPITAL | Age: 31
DRG: 660 | End: 2021-08-24
Payer: MEDICAID

## 2021-08-24 ENCOUNTER — ANESTHESIA (OUTPATIENT)
Dept: SURGERY | Facility: HOSPITAL | Age: 31
DRG: 660 | End: 2021-08-24
Payer: MEDICAID

## 2021-08-24 LAB
ANION GAP SERPL CALC-SCNC: 3 MMOL/L (ref 0–18)
BUN BLD-MCNC: 13 MG/DL (ref 7–18)
CALCIUM BLD-MCNC: 8.3 MG/DL (ref 8.5–10.1)
CHLORIDE SERPL-SCNC: 110 MMOL/L (ref 98–112)
CO2 SERPL-SCNC: 25 MMOL/L (ref 21–32)
CREAT BLD-MCNC: 1.18 MG/DL
GLUCOSE BLD-MCNC: 105 MG/DL (ref 70–99)
OSMOLALITY SERPL CALC.SUM OF ELEC: 286 MOSM/KG (ref 275–295)
POTASSIUM SERPL-SCNC: 3.9 MMOL/L (ref 3.5–5.1)
SODIUM SERPL-SCNC: 138 MMOL/L (ref 136–145)

## 2021-08-24 PROCEDURE — 0T778DZ DILATION OF LEFT URETER WITH INTRALUMINAL DEVICE, VIA NATURAL OR ARTIFICIAL OPENING ENDOSCOPIC: ICD-10-PCS | Performed by: UROLOGY

## 2021-08-24 PROCEDURE — 99232 SBSQ HOSP IP/OBS MODERATE 35: CPT | Performed by: HOSPITALIST

## 2021-08-24 PROCEDURE — 0TC78ZZ EXTIRPATION OF MATTER FROM LEFT URETER, VIA NATURAL OR ARTIFICIAL OPENING ENDOSCOPIC: ICD-10-PCS | Performed by: UROLOGY

## 2021-08-24 PROCEDURE — BT1FZZZ FLUOROSCOPY OF LEFT KIDNEY, URETER AND BLADDER: ICD-10-PCS | Performed by: UROLOGY

## 2021-08-24 DEVICE — URETERAL STENT
Type: IMPLANTABLE DEVICE | Site: URETER | Status: FUNCTIONAL
Brand: ASCERTA™

## 2021-08-24 RX ORDER — DEXAMETHASONE SODIUM PHOSPHATE 4 MG/ML
VIAL (ML) INJECTION AS NEEDED
Status: DISCONTINUED | OUTPATIENT
Start: 2021-08-24 | End: 2021-08-24 | Stop reason: SURG

## 2021-08-24 RX ORDER — CEFAZOLIN SODIUM/WATER 2 G/20 ML
2 SYRINGE (ML) INTRAVENOUS
Status: COMPLETED | OUTPATIENT
Start: 2021-08-24 | End: 2021-08-24

## 2021-08-24 RX ORDER — HYDROMORPHONE HYDROCHLORIDE 1 MG/ML
0.4 INJECTION, SOLUTION INTRAMUSCULAR; INTRAVENOUS; SUBCUTANEOUS EVERY 5 MIN PRN
Status: DISCONTINUED | OUTPATIENT
Start: 2021-08-24 | End: 2021-08-24 | Stop reason: HOSPADM

## 2021-08-24 RX ORDER — HYDROCODONE BITARTRATE AND ACETAMINOPHEN 5; 325 MG/1; MG/1
1 TABLET ORAL AS NEEDED
Status: DISCONTINUED | OUTPATIENT
Start: 2021-08-24 | End: 2021-08-24 | Stop reason: HOSPADM

## 2021-08-24 RX ORDER — NALOXONE HYDROCHLORIDE 0.4 MG/ML
80 INJECTION, SOLUTION INTRAMUSCULAR; INTRAVENOUS; SUBCUTANEOUS AS NEEDED
Status: DISCONTINUED | OUTPATIENT
Start: 2021-08-24 | End: 2021-08-24 | Stop reason: HOSPADM

## 2021-08-24 RX ORDER — MIDAZOLAM HYDROCHLORIDE 1 MG/ML
1 INJECTION INTRAMUSCULAR; INTRAVENOUS EVERY 5 MIN PRN
Status: DISCONTINUED | OUTPATIENT
Start: 2021-08-24 | End: 2021-08-24 | Stop reason: HOSPADM

## 2021-08-24 RX ORDER — ONDANSETRON 2 MG/ML
4 INJECTION INTRAMUSCULAR; INTRAVENOUS AS NEEDED
Status: DISCONTINUED | OUTPATIENT
Start: 2021-08-24 | End: 2021-08-24 | Stop reason: HOSPADM

## 2021-08-24 RX ORDER — HYDROCODONE BITARTRATE AND ACETAMINOPHEN 5; 325 MG/1; MG/1
2 TABLET ORAL AS NEEDED
Status: DISCONTINUED | OUTPATIENT
Start: 2021-08-24 | End: 2021-08-24 | Stop reason: HOSPADM

## 2021-08-24 RX ORDER — PHENYLEPHRINE HCL 10 MG/ML
VIAL (ML) INJECTION AS NEEDED
Status: DISCONTINUED | OUTPATIENT
Start: 2021-08-24 | End: 2021-08-24 | Stop reason: SURG

## 2021-08-24 RX ORDER — MEPERIDINE HYDROCHLORIDE 25 MG/ML
12.5 INJECTION INTRAMUSCULAR; INTRAVENOUS; SUBCUTANEOUS AS NEEDED
Status: DISCONTINUED | OUTPATIENT
Start: 2021-08-24 | End: 2021-08-24 | Stop reason: HOSPADM

## 2021-08-24 RX ORDER — LIDOCAINE HYDROCHLORIDE 10 MG/ML
INJECTION, SOLUTION EPIDURAL; INFILTRATION; INTRACAUDAL; PERINEURAL AS NEEDED
Status: DISCONTINUED | OUTPATIENT
Start: 2021-08-24 | End: 2021-08-24 | Stop reason: SURG

## 2021-08-24 RX ORDER — SODIUM CHLORIDE, SODIUM LACTATE, POTASSIUM CHLORIDE, CALCIUM CHLORIDE 600; 310; 30; 20 MG/100ML; MG/100ML; MG/100ML; MG/100ML
INJECTION, SOLUTION INTRAVENOUS CONTINUOUS
Status: DISCONTINUED | OUTPATIENT
Start: 2021-08-24 | End: 2021-08-24 | Stop reason: HOSPADM

## 2021-08-24 RX ORDER — METOCLOPRAMIDE HYDROCHLORIDE 5 MG/ML
10 INJECTION INTRAMUSCULAR; INTRAVENOUS AS NEEDED
Status: DISCONTINUED | OUTPATIENT
Start: 2021-08-24 | End: 2021-08-24 | Stop reason: HOSPADM

## 2021-08-24 RX ORDER — CEFAZOLIN SODIUM 1 G/3ML
INJECTION, POWDER, FOR SOLUTION INTRAMUSCULAR; INTRAVENOUS AS NEEDED
Status: DISCONTINUED | OUTPATIENT
Start: 2021-08-24 | End: 2021-08-24 | Stop reason: SURG

## 2021-08-24 RX ORDER — DIPHENHYDRAMINE HYDROCHLORIDE 50 MG/ML
12.5 INJECTION INTRAMUSCULAR; INTRAVENOUS AS NEEDED
Status: DISCONTINUED | OUTPATIENT
Start: 2021-08-24 | End: 2021-08-24 | Stop reason: HOSPADM

## 2021-08-24 RX ORDER — ONDANSETRON 2 MG/ML
INJECTION INTRAMUSCULAR; INTRAVENOUS AS NEEDED
Status: DISCONTINUED | OUTPATIENT
Start: 2021-08-24 | End: 2021-08-24 | Stop reason: SURG

## 2021-08-24 RX ADMIN — CEFAZOLIN SODIUM 2 G: 1 INJECTION, POWDER, FOR SOLUTION INTRAMUSCULAR; INTRAVENOUS at 16:20:00

## 2021-08-24 RX ADMIN — DEXAMETHASONE SODIUM PHOSPHATE 4 MG: 4 MG/ML VIAL (ML) INJECTION at 16:14:00

## 2021-08-24 RX ADMIN — PHENYLEPHRINE HCL 100 MCG: 10 MG/ML VIAL (ML) INJECTION at 16:37:00

## 2021-08-24 RX ADMIN — PHENYLEPHRINE HCL 100 MCG: 10 MG/ML VIAL (ML) INJECTION at 16:48:00

## 2021-08-24 RX ADMIN — SODIUM CHLORIDE: 9 INJECTION, SOLUTION INTRAVENOUS at 17:01:00

## 2021-08-24 RX ADMIN — ONDANSETRON 4 MG: 2 INJECTION INTRAMUSCULAR; INTRAVENOUS at 16:14:00

## 2021-08-24 RX ADMIN — LIDOCAINE HYDROCHLORIDE 50 MG: 10 INJECTION, SOLUTION EPIDURAL; INFILTRATION; INTRACAUDAL; PERINEURAL at 16:14:00

## 2021-08-24 NOTE — PLAN OF CARE
NURSING ADMISSION NOTE    Pt admitted from ED to room 7640. Patient admitted via Cart. Oriented to room. Safety precautions initiated. Bed in low position. Call light in reach. Admission navigator completed. Pt a/ox4. VSS. NSR per tele.  KRISTEN.

## 2021-08-24 NOTE — ANESTHESIA POSTPROCEDURE EVALUATION
[address] [place] [address] [address] Patient Status:  Inpatient   Age/Gender 28year old female MRN [id number]   Location [facility] Attending Christie Lagos MD   Hosp Day # 1 PCP Colletta Silos, MD       Anesthesia Post

## 2021-08-24 NOTE — PAYOR COMM NOTE
--------------  ADMISSION REVIEW     Payor: José Miguel Baeza #:  [id number]  Authorization Number: [id number]    Admit date: 8/23/21  Admit time: 11:27 PM       REVIEW DOCUMENTATION:     ED Provider Notes      ED Provid Current:/66   Pulse 74   Temp 96.5 °F (35.8 °C) (Temporal)   Resp 19   LMP 07/09/2021 (Approximate)   SpO2 100%         Physical Exam    GENERAL: Patient is awake, alert, well-appearing, in no acute distress. HEENT:no scleral icterus.   Mucous orders. We will place IV, check CBC chemistry urinalysis, pregnancy test, CT abdomen/pelvis.   Differential diagnosis includes nephrolithiasis, ureterolithiasis, pyelonephritis, diverticulitis, ovarian cyst, ectopic pregnancy, and other        MDM Chief Complaint: Left flank pain    History of Present Illness: Padmini Núñez is a 28year old female with a PMH of kidney stones and medullary sponge kidney presented to ED due to left flank pain that began yesterday.  Pain rated 7/10 currentl Detected 03/23/2021           ASSESSMENT / PLAN:     1. Left ureteral calculus  1. IVF  2. Pain control  3. Urology consulted  2. MEKHI secondary to #1  1. IVF  3. Hyponatremia  1. IVF  4.  Medullary sponge kidney    Quality:  · DVT Prophylaxis: heparin  · CO for input(s): PTP, INR in the last 168 hours.        COVID-19 Lab Results     COVID-19        Lab Results   Component Value Date     COVID19 Not Detected 08/23/2021     COVID19 Not Detected 05/26/2021     COVID19 Not Detected 03/23/2021         Pro-Calcito Maricarmen Spann      ondansetron [facility]) injection 4 mg     Date Action Dose Route User    8/23/2021 2235 Given 4 mg Intravenous (Right Antecubital) Maricarmen Spann      0.9% NaCl infusion     Date Action Dose Route User    8/24/2021 1346 Restarted

## 2021-08-24 NOTE — H&P
[facility]  History and Physical     Padmini Núñez Patient Status:  Emergency    6/15/1990 MRN [id number]   Location [facility] Attending Pradeep [facility], [address] [facility] Day # 0 PCP Juanito Ordaz MD     Chief Complai to auscultation bilaterally. No wheezes. No rhonchi. Cardiovascular: S1, S2. Regular rate and rhythm. No murmurs, rubs or gallops. Equal pulses. Chest and Back: No tenderness or deformity. Abdomen: Soft, nontender, nondistended. Positive bowel sounds.

## 2021-08-24 NOTE — CONSULTS
[facility] Urology   Consultation Note    Julienjesse Bowling Patient Status:  Inpatient    6/15/1990 MRN [id number]   [facility] 7NE-A Attending Jaswant Garza MD   Hosp Day # 1 PCP Reilly Steel MD     Reason for Consultat smoked. She has never used smokeless tobacco. She reports that she does not drink alcohol and does not use drugs.     Allergies:  No Known Allergies    Medications:    Current Facility-Administered Medications:   •  0.9% NaCl infusion, , Intravenous, Contin scan without contrast 8/23/21:  FINDINGS:     KIDNEYS:  Multiple calcifications bilaterally consistent with known medullary sponge kidney.  No obstruction on the right.  Moderate to severe left hydronephrosis with hydroureter to the level of the mid ureter negative    Recommendations: We discussed ureteroscopy as a minimally invasive surgical procedure whereby a small scope is placed through the urethra, through the bladder, up the ureter and potentially into the kidney to allow treatment of disease.  A ball with the patient who understands. Patient informed if there are any concerns for infection at time of procedure, she would only have a stent placed requiring subsequent procedure.   Patient also informed that the ureteral stent is not a permament implant a

## 2021-08-24 NOTE — ED QUICK NOTES
Pt had one episode of emesis, clear with food flecks. VS improved. IV found leaking and discontinued.

## 2021-08-24 NOTE — ANESTHESIA PREPROCEDURE EVALUATION
PRE-OP EVALUATION    Patient Name: Aurora Oliver    Admit Diagnosis: Ureterolithiasis [N20.1]  Obstructive uropathy [N13.9]  Intractable abdominal pain [R10.9]    Pre-op Diagnosis: Calculi, ureter [N20.1]    CYSTOSCOPY, LEFT RETROGRADE PYELOGRAM, PRN        Outpatient Medications:   [DISCONTINUED] ibuprofen 200 MG Oral Tab, Take 400 mg by mouth every 6 (six) hours as needed for Pain or Fever., Disp: , Rfl: , 8/23/2021 at 0900        Allergies: Patient has no known allergies.       Anesthesia Evaluat status verified and patient meets guidelines. Post-procedure pain management plan discussed with surgeon and patient. Comment: Discussed general anesthesia with endotracheal tube/supraglottic airway with patient.  Discussed risk of oral/dental trauma,

## 2021-08-24 NOTE — PROGRESS NOTES
[facility]  Progress Note     Heather Dash Patient Status:  Inpatient    6/15/1990 MRN [id number]   [facility] 7NE-A Attending Jaswant Garza MD   Hosp Day # 1 PCP Reilly Steel MD     Chief Complaint: kidney stone    S hours. Cardiac  No results for input(s): TROP, PBNP in the last 168 hours. Creatinine Kinase  No results for input(s): CK in the last 168 hours. Inflammatory Markers  No results for input(s): CRP, JACQUIE, LDH, DDIMER in the last 168 hours.     Imaging

## 2021-08-24 NOTE — ANESTHESIA PROCEDURE NOTES
Airway  Date/Time: 8/24/2021 4:17 PM  Urgency: elective      General Information and Staff    Patient location during procedure: OR  Anesthesiologist: Flori Sims MD  Performed: anesthesiologist     Indications and Patient Condition  Indication

## 2021-08-24 NOTE — ED PROVIDER NOTES
Patient Seen in: [facility] Emergency Department      History   Patient presents with:  Abdomen/Flank Pain    Stated Complaint: Kidney problems, vomitting,    HPI/Subjective:   HPI    26-year-old female presents emergency with chief complaint of lef Physical Exam    GENERAL: Patient is awake, alert, well-appearing, in no acute distress. HEENT:no scleral icterus. Mucous membranes are moist, oropharynx is clear, uvula midline. HEART: Regular rate and rhythm, no murmurs.   LUNGS: Clear to ausc PCR          We will place IV, check CBC chemistry urinalysis, pregnancy test, CT abdomen/pelvis.   Differential diagnosis includes nephrolithiasis, ureterolithiasis, pyelonephritis, diverticulitis, ovarian cyst, ectopic pregnancy, and other         MDM

## 2021-08-25 VITALS
DIASTOLIC BLOOD PRESSURE: 59 MMHG | OXYGEN SATURATION: 100 % | HEART RATE: 86 BPM | SYSTOLIC BLOOD PRESSURE: 111 MMHG | TEMPERATURE: 98 F | RESPIRATION RATE: 15 BRPM

## 2021-08-25 LAB
ANION GAP SERPL CALC-SCNC: 3 MMOL/L (ref 0–18)
BUN BLD-MCNC: 12 MG/DL (ref 7–18)
CALCIUM BLD-MCNC: 7.8 MG/DL (ref 8.5–10.1)
CHLORIDE SERPL-SCNC: 110 MMOL/L (ref 98–112)
CO2 SERPL-SCNC: 25 MMOL/L (ref 21–32)
CREAT BLD-MCNC: 0.98 MG/DL
GLUCOSE BLD-MCNC: 103 MG/DL (ref 70–99)
OSMOLALITY SERPL CALC.SUM OF ELEC: 286 MOSM/KG (ref 275–295)
POTASSIUM SERPL-SCNC: 4.9 MMOL/L (ref 3.5–5.1)
SODIUM SERPL-SCNC: 138 MMOL/L (ref 136–145)

## 2021-08-25 PROCEDURE — 99238 HOSP IP/OBS DSCHRG MGMT 30/<: CPT | Performed by: INTERNAL MEDICINE

## 2021-08-25 RX ORDER — TRAMADOL HYDROCHLORIDE 50 MG/1
50 TABLET ORAL ONCE
Status: COMPLETED | OUTPATIENT
Start: 2021-08-25 | End: 2021-08-25

## 2021-08-25 RX ORDER — TRAMADOL HYDROCHLORIDE 50 MG/1
50 TABLET ORAL EVERY 6 HOURS PRN
Qty: 15 TABLET | Refills: 0 | Status: SHIPPED | OUTPATIENT
Start: 2021-08-25

## 2021-08-25 RX ORDER — KETOROLAC TROMETHAMINE 15 MG/ML
15 INJECTION, SOLUTION INTRAMUSCULAR; INTRAVENOUS EVERY 6 HOURS PRN
Status: DISCONTINUED | OUTPATIENT
Start: 2021-08-25 | End: 2021-08-25

## 2021-08-25 NOTE — DISCHARGE SUMMARY
[facility]  DISCHARGE SUMMARY     Sriram Perez Patient Status:  Inpatient    6/15/1990 MRN [id number]   [facility] 7NE-A Attending No att. providers found   Hosp Day # 2 PCP Keiko Marie MD     Date of Admission:  Blank Nathan MD  [address] [address]  [phone]    In 1 week      Appointments for Next 30 Days 8/26/2021 - 9/25/2021      Date Arrival Time Visit Type Length Department Provider     9/1/2021  1:30 PM  PROCEDURE [8007] 15 min Urology -

## 2021-08-25 NOTE — PROGRESS NOTES
Assumed care of pt at 0700. Pt NPO for cystoscopy. Pt brought down for procedure around 1630. Returned to unit after 1730. Pt voiding, no issues, no bleeding. Regular diet ordered. NS continued until tolerating diet.   Per urology- can discharge tomorr

## 2021-08-25 NOTE — PLAN OF CARE
Patient alert and oriented x4. C/o 5/10 abdominal pain. Given PRN Morphine. Did not like the feeling Morphine gave her. PRN Toradol added. Voiding without difficulties.       Problem: PAIN - ADULT  Goal: Verbalizes/displays adequate comfort level or patie

## 2021-08-25 NOTE — PROGRESS NOTES
[facility]    Progress Note    Poly  Patient Status:  Inpatient    6/15/1990 MRN [id number]   [facility] 7NE-A Attending Aden Khalil MD   Hosp Day # 2 PCP Rachel Nascimento MD     HPI/Subjective:   Gray Garcia Assessment & Plan:   POD 1 s/p left URS/LL for obstructing left ureteral calculi. Stones cleared. Stent in place. OK for discharge from urologic perspective.   OK to discharge with a few days of norco.  Recommend she also takes NSAIDs and Azo for s

## 2021-08-25 NOTE — PLAN OF CARE
Assumed care at 0700. Patient alert and oriented. Complains of lower abdominal pain prn Toradol and tramadol given with relief. Vitals stable. Discharge instructions reviewed and given. Patient verbalized understanding. Discharged via wheelchair.    Problem Consider OT/PT consult to assist with strengthening/mobility  - Encourage toileting schedule  Outcome: Adequate for Discharge

## 2021-08-26 NOTE — PAYOR COMM NOTE
--------------  DISCHARGE REVIEW    Payor: José Miguel Baeza #:  [id number]  Authorization Number: [id number]    Admit date: 8/23/21  Admit time:  11:27 PM  Discharge Date: 8/25/2021  2:10 PM     Admitting Physician: Jong Toscano

## 2021-10-09 ENCOUNTER — APPOINTMENT (OUTPATIENT)
Dept: GENERAL RADIOLOGY | Facility: HOSPITAL | Age: 31
End: 2021-10-09
Attending: EMERGENCY MEDICINE
Payer: COMMERCIAL

## 2021-10-09 ENCOUNTER — HOSPITAL ENCOUNTER (EMERGENCY)
Facility: HOSPITAL | Age: 31
Discharge: HOME | End: 2021-10-09
Attending: EMERGENCY MEDICINE
Payer: COMMERCIAL

## 2021-10-09 VITALS
BODY MASS INDEX: 31.15 KG/M2 | HEART RATE: 84 BPM | SYSTOLIC BLOOD PRESSURE: 112 MMHG | RESPIRATION RATE: 16 BRPM | TEMPERATURE: 98 F | DIASTOLIC BLOOD PRESSURE: 70 MMHG | OXYGEN SATURATION: 98 % | WEIGHT: 165 LBS | HEIGHT: 61 IN

## 2021-10-09 DIAGNOSIS — S50.12XA CONTUSION OF LEFT FOREARM, INITIAL ENCOUNTER: ICD-10-CM

## 2021-10-09 DIAGNOSIS — S70.12XA CONTUSION OF LEFT THIGH, INITIAL ENCOUNTER: ICD-10-CM
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
## 2021-10-09 PROCEDURE — 73552 X-RAY EXAM OF FEMUR 2/>: CPT | Performed by: EMERGENCY MEDICINE

## 2021-10-09 PROCEDURE — 99284 EMERGENCY DEPT VISIT MOD MDM: CPT | Performed by: EMERGENCY MEDICINE

## 2021-10-09 PROCEDURE — 73090 X-RAY EXAM OF FOREARM: CPT | Performed by: EMERGENCY MEDICINE

## 2021-10-09 RX ORDER — IBUPROFEN 600 MG/1
600 TABLET ORAL ONCE
Status: COMPLETED | OUTPATIENT
Start: 2021-10-09 | End: 2021-10-09
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
## 2022-01-18 ENCOUNTER — TELEMEDICINE (OUTPATIENT)
Dept: FAMILY MEDICINE CLINIC | Facility: CLINIC | Age: 32
End: 2022-01-18
Payer: MEDICAID

## 2022-01-18 DIAGNOSIS — J02.9 SORE THROAT: Primary | ICD-10-CM

## 2022-01-18 PROCEDURE — 99212 OFFICE O/P EST SF 10 MIN: CPT | Performed by: FAMILY MEDICINE

## 2022-01-18 NOTE — PROGRESS NOTES
This visit is conducted using Telemedicine with live, interactive video and audio. Patient has been referred to the [facility] website at [email or web address] to review the yearly Consent to Treat document.     Patient understands and accepts financial res

## 2022-01-19 ENCOUNTER — LAB ENCOUNTER (OUTPATIENT)
Dept: LAB | Facility: HOSPITAL | Age: 32
End: 2022-01-19
Attending: FAMILY MEDICINE
Payer: MEDICAID

## 2022-01-19 DIAGNOSIS — J02.9 SORE THROAT: ICD-10-CM

## 2022-01-21 LAB — SARS-COV-2 RNA RESP QL NAA+PROBE: NOT DETECTED

## 2022-02-10 ENCOUNTER — HOSPITAL ENCOUNTER (EMERGENCY)
Facility: HOSPITAL | Age: 32
Discharge: HOME | End: 2022-02-10
Attending: EMERGENCY MEDICINE
Payer: MEDICAID

## 2022-02-10 ENCOUNTER — APPOINTMENT (OUTPATIENT)
Dept: CT IMAGING | Facility: HOSPITAL | Age: 32
End: 2022-02-10
Attending: EMERGENCY MEDICINE
Payer: MEDICAID

## 2022-02-10 VITALS
TEMPERATURE: 99 F | WEIGHT: 175 LBS | BODY MASS INDEX: 33.04 KG/M2 | RESPIRATION RATE: 16 BRPM | DIASTOLIC BLOOD PRESSURE: 68 MMHG | HEART RATE: 81 BPM | SYSTOLIC BLOOD PRESSURE: 113 MMHG | OXYGEN SATURATION: 100 % | HEIGHT: 61 IN

## 2022-02-10 DIAGNOSIS — N20.0 KIDNEY STONE: Primary | ICD-10-CM

## 2022-02-10 DIAGNOSIS — R53.83 FATIGUE, UNSPECIFIED TYPE: ICD-10-CM

## 2022-02-10 LAB
ALBUMIN SERPL-MCNC: 3.3 G/DL (ref 3.4–5)
ALBUMIN/GLOB SERPL: 0.8 {RATIO} (ref 1–2)
ALP LIVER SERPL-CCNC: 73 U/L
ALT SERPL-CCNC: 22 U/L
ANION GAP SERPL CALC-SCNC: 4 MMOL/L (ref 0–18)
AST SERPL-CCNC: 18 U/L (ref 15–37)
B-HCG UR QL: NEGATIVE
BASOPHILS # BLD AUTO: 0.04 X10(3) UL (ref 0–0.2)
BASOPHILS NFR BLD AUTO: 0.5 %
BILIRUB SERPL-MCNC: 0.3 MG/DL (ref 0.1–2)
BILIRUB UR QL STRIP.AUTO: NEGATIVE
BUN BLD-MCNC: 7 MG/DL (ref 7–18)
CALCIUM BLD-MCNC: 9 MG/DL (ref 8.5–10.1)
CHLORIDE SERPL-SCNC: 107 MMOL/L (ref 98–112)
CLARITY UR REFRACT.AUTO: CLEAR
CO2 SERPL-SCNC: 25 MMOL/L (ref 21–32)
COLOR UR AUTO: YELLOW
CREAT BLD-MCNC: 0.88 MG/DL
EOSINOPHIL NFR BLD AUTO: 4 %
ERYTHROCYTE [DISTWIDTH] IN BLOOD BY AUTOMATED COUNT: 13.4 %
GLUCOSE BLD-MCNC: 96 MG/DL (ref 70–99)
GLUCOSE UR STRIP.AUTO-MCNC: NEGATIVE MG/DL
HCT VFR BLD AUTO: 41.5 %
HGB BLD-MCNC: 13.7 G/DL
IMM GRANULOCYTES # BLD AUTO: 0.02 X10(3) UL (ref 0–1)
IMM GRANULOCYTES NFR BLD: 0.3 %
KETONES UR STRIP.AUTO-MCNC: NEGATIVE MG/DL
LYMPHOCYTES # BLD AUTO: 1.95 X10(3) UL (ref 1–4)
LYMPHOCYTES NFR BLD AUTO: 24.4 %
MCH RBC QN AUTO: 31.3 PG (ref 26–34)
MCHC RBC AUTO-ENTMCNC: 33 G/DL (ref 31–37)
MCV RBC AUTO: 94.7 FL
MONOCYTES # BLD AUTO: 0.53 X10(3) UL (ref 0.1–1)
MONOCYTES NFR BLD AUTO: 6.6 %
NEUTROPHILS # BLD AUTO: 5.14 X10 (3) UL (ref 1.5–7.7)
NEUTROPHILS # BLD AUTO: 5.14 X10(3) UL (ref 1.5–7.7)
NEUTROPHILS NFR BLD AUTO: 64.2 %
NITRITE UR QL STRIP.AUTO: NEGATIVE
OSMOLALITY SERPL CALC.SUM OF ELEC: 280 MOSM/KG (ref 275–295)
PH UR STRIP.AUTO: 6 [PH] (ref 5–8)
PLATELET # BLD AUTO: 250 10(3)UL (ref 150–450)
POTASSIUM SERPL-SCNC: 3.5 MMOL/L (ref 3.5–5.1)
PROT SERPL-MCNC: 7.4 G/DL (ref 6.4–8.2)
PROT UR STRIP.AUTO-MCNC: NEGATIVE MG/DL
RBC # BLD AUTO: 4.38 X10(6)UL
SARS-COV-2 RNA RESP QL NAA+PROBE: NOT DETECTED
SODIUM SERPL-SCNC: 136 MMOL/L (ref 136–145)
SP GR UR STRIP.AUTO: 1.01 (ref 1–1.03)
TSI SER-ACNC: 0.68 MIU/ML (ref 0.36–3.74)
UROBILINOGEN UR STRIP.AUTO-MCNC: <2 MG/DL
WBC # BLD AUTO: 8 X10(3) UL (ref 4–11)

## 2022-02-10 PROCEDURE — 99285 EMERGENCY DEPT VISIT HI MDM: CPT

## 2022-02-10 PROCEDURE — 85025 COMPLETE CBC W/AUTO DIFF WBC: CPT | Performed by: EMERGENCY MEDICINE

## 2022-02-10 PROCEDURE — 74176 CT ABD & PELVIS W/O CONTRAST: CPT | Performed by: EMERGENCY MEDICINE

## 2022-02-10 PROCEDURE — 81001 URINALYSIS AUTO W/SCOPE: CPT | Performed by: EMERGENCY MEDICINE

## 2022-02-10 PROCEDURE — 84443 ASSAY THYROID STIM HORMONE: CPT | Performed by: EMERGENCY MEDICINE

## 2022-02-10 PROCEDURE — 87086 URINE CULTURE/COLONY COUNT: CPT | Performed by: EMERGENCY MEDICINE

## 2022-02-10 PROCEDURE — 96360 HYDRATION IV INFUSION INIT: CPT

## 2022-02-10 PROCEDURE — 80053 COMPREHEN METABOLIC PANEL: CPT | Performed by: EMERGENCY MEDICINE

## 2022-02-10 PROCEDURE — 99284 EMERGENCY DEPT VISIT MOD MDM: CPT

## 2022-02-10 PROCEDURE — 81025 URINE PREGNANCY TEST: CPT

## 2022-02-10 RX ORDER — TAMSULOSIN HYDROCHLORIDE 0.4 MG/1
0.4 CAPSULE ORAL DAILY
Qty: 7 CAPSULE | Refills: 0 | Status: SHIPPED | OUTPATIENT
Start: 2022-02-10 | End: 2022-02-17

## 2022-02-10 RX ORDER — HYDROCODONE BITARTRATE AND ACETAMINOPHEN 5; 325 MG/1; MG/1
1-2 TABLET ORAL EVERY 4 HOURS PRN
Qty: 20 TABLET | Refills: 0 | Status: SHIPPED | OUTPATIENT
Start: 2022-02-10

## 2022-02-10 NOTE — ED INITIAL ASSESSMENT (HPI)
C/o flank pain. Pt states she has had kidney stones in the past and surgery to removed the kidney stone. Pt also has hx of kidney infection. Pt states she feels fatigued and tired.

## 2022-05-24 ENCOUNTER — APPOINTMENT (OUTPATIENT)
Dept: CT IMAGING | Facility: HOSPITAL | Age: 32
End: 2022-05-24
Attending: EMERGENCY MEDICINE
Payer: MEDICAID

## 2022-05-24 ENCOUNTER — HOSPITAL ENCOUNTER (EMERGENCY)
Facility: HOSPITAL | Age: 32
Discharge: HOME | End: 2022-05-24
Attending: EMERGENCY MEDICINE
Payer: MEDICAID

## 2022-05-24 VITALS
RESPIRATION RATE: 15 BRPM | BODY MASS INDEX: 34.93 KG/M2 | OXYGEN SATURATION: 16 % | HEART RATE: 93 BPM | WEIGHT: 185 LBS | SYSTOLIC BLOOD PRESSURE: 129 MMHG | DIASTOLIC BLOOD PRESSURE: 87 MMHG | HEIGHT: 61 IN | TEMPERATURE: 99 F

## 2022-05-24 DIAGNOSIS — N20.0 KIDNEY STONE: Primary | ICD-10-CM

## 2022-05-24 LAB
ALBUMIN SERPL-MCNC: 3.2 G/DL (ref 3.4–5)
ALBUMIN/GLOB SERPL: 0.8 {RATIO} (ref 1–2)
ALP LIVER SERPL-CCNC: 70 U/L
ALT SERPL-CCNC: 23 U/L
ANION GAP SERPL CALC-SCNC: 4 MMOL/L (ref 0–18)
AST SERPL-CCNC: 20 U/L (ref 15–37)
B-HCG UR QL: NEGATIVE
BASOPHILS # BLD AUTO: 0.06 X10(3) UL (ref 0–0.2)
BASOPHILS NFR BLD AUTO: 0.6 %
BILIRUB SERPL-MCNC: 0.4 MG/DL (ref 0.1–2)
BILIRUB UR QL STRIP.AUTO: NEGATIVE
BUN BLD-MCNC: 9 MG/DL (ref 7–18)
CALCIUM BLD-MCNC: 8.7 MG/DL (ref 8.5–10.1)
CHLORIDE SERPL-SCNC: 107 MMOL/L (ref 98–112)
CLARITY UR REFRACT.AUTO: CLEAR
CO2 SERPL-SCNC: 25 MMOL/L (ref 21–32)
COLOR UR AUTO: YELLOW
CREAT BLD-MCNC: 0.91 MG/DL
EOSINOPHIL # BLD AUTO: 0.17 X10(3) UL (ref 0–0.7)
EOSINOPHIL NFR BLD AUTO: 1.8 %
ERYTHROCYTE [DISTWIDTH] IN BLOOD BY AUTOMATED COUNT: 13.5 %
GLOBULIN PLAS-MCNC: 4.2 G/DL (ref 2.8–4.4)
GLUCOSE BLD-MCNC: 107 MG/DL (ref 70–99)
GLUCOSE UR STRIP.AUTO-MCNC: NEGATIVE MG/DL
HCT VFR BLD AUTO: 40.5 %
HGB BLD-MCNC: 13.6 G/DL
IMM GRANULOCYTES # BLD AUTO: 0.02 X10(3) UL (ref 0–1)
IMM GRANULOCYTES NFR BLD: 0.2 %
KETONES UR STRIP.AUTO-MCNC: NEGATIVE MG/DL
LIPASE SERPL-CCNC: 108 U/L (ref 73–393)
LYMPHOCYTES # BLD AUTO: 1.46 X10(3) UL (ref 1–4)
LYMPHOCYTES NFR BLD AUTO: 15.2 %
MCH RBC QN AUTO: 31.6 PG (ref 26–34)
MCHC RBC AUTO-ENTMCNC: 33.6 G/DL (ref 31–37)
MCV RBC AUTO: 94.2 FL
MONOCYTES # BLD AUTO: 0.68 X10(3) UL (ref 0.1–1)
MONOCYTES NFR BLD AUTO: 7.1 %
NEUTROPHILS # BLD AUTO: 7.2 X10 (3) UL (ref 1.5–7.7)
NEUTROPHILS # BLD AUTO: 7.2 X10(3) UL (ref 1.5–7.7)
NEUTROPHILS NFR BLD AUTO: 75.1 %
NITRITE UR QL STRIP.AUTO: NEGATIVE
OSMOLALITY SERPL CALC.SUM OF ELEC: 281 MOSM/KG (ref 275–295)
PH UR STRIP.AUTO: 6 [PH] (ref 5–8)
PLATELET # BLD AUTO: 238 10(3)UL (ref 150–450)
POTASSIUM SERPL-SCNC: 4.1 MMOL/L (ref 3.5–5.1)
PROT SERPL-MCNC: 7.4 G/DL (ref 6.4–8.2)
PROT UR STRIP.AUTO-MCNC: NEGATIVE MG/DL
RBC # BLD AUTO: 4.3 X10(6)UL
SODIUM SERPL-SCNC: 136 MMOL/L (ref 136–145)
SP GR UR STRIP.AUTO: 1.01 (ref 1–1.03)
UROBILINOGEN UR STRIP.AUTO-MCNC: <2 MG/DL
WBC # BLD AUTO: 9.6 X10(3) UL (ref 4–11)

## 2022-05-24 PROCEDURE — 99284 EMERGENCY DEPT VISIT MOD MDM: CPT

## 2022-05-24 PROCEDURE — 81025 URINE PREGNANCY TEST: CPT

## 2022-05-24 PROCEDURE — 80053 COMPREHEN METABOLIC PANEL: CPT | Performed by: EMERGENCY MEDICINE

## 2022-05-24 PROCEDURE — 81001 URINALYSIS AUTO W/SCOPE: CPT | Performed by: EMERGENCY MEDICINE

## 2022-05-24 PROCEDURE — 87086 URINE CULTURE/COLONY COUNT: CPT | Performed by: EMERGENCY MEDICINE

## 2022-05-24 PROCEDURE — 83690 ASSAY OF LIPASE: CPT | Performed by: EMERGENCY MEDICINE

## 2022-05-24 PROCEDURE — 85025 COMPLETE CBC W/AUTO DIFF WBC: CPT | Performed by: EMERGENCY MEDICINE

## 2022-05-24 PROCEDURE — 74176 CT ABD & PELVIS W/O CONTRAST: CPT | Performed by: EMERGENCY MEDICINE

## 2022-05-24 PROCEDURE — 96361 HYDRATE IV INFUSION ADD-ON: CPT

## 2022-05-24 PROCEDURE — 96374 THER/PROPH/DIAG INJ IV PUSH: CPT

## 2022-05-24 RX ORDER — HYDROCODONE BITARTRATE AND ACETAMINOPHEN 5; 325 MG/1; MG/1
1-2 TABLET ORAL EVERY 6 HOURS PRN
Qty: 10 TABLET | Refills: 0 | Status: SHIPPED | OUTPATIENT
Start: 2022-05-24 | End: 2022-05-31

## 2022-05-24 RX ORDER — MORPHINE SULFATE 4 MG/ML
4 INJECTION, SOLUTION INTRAMUSCULAR; INTRAVENOUS ONCE
Status: DISCONTINUED | OUTPATIENT
Start: 2022-05-24 | End: 2022-05-24

## 2022-05-24 RX ORDER — CEPHALEXIN 500 MG/1
500 CAPSULE ORAL 3 TIMES DAILY
Qty: 30 CAPSULE | Refills: 0 | Status: SHIPPED | OUTPATIENT
Start: 2022-05-24 | End: 2022-06-03

## 2022-05-24 RX ORDER — KETOROLAC TROMETHAMINE 30 MG/ML
15 INJECTION, SOLUTION INTRAMUSCULAR; INTRAVENOUS ONCE
Status: COMPLETED | OUTPATIENT
Start: 2022-05-24 | End: 2022-05-24

## 2022-05-24 NOTE — ED QUICK NOTES
Pt reevaluated by dr. Mona Musa. Pt informed of all test reports and plan of care.  Pt vebralizing understanding

## 2022-08-13 ENCOUNTER — APPOINTMENT (OUTPATIENT)
Dept: CT IMAGING | Facility: HOSPITAL | Age: 32
End: 2022-08-13
Attending: EMERGENCY MEDICINE
Payer: MEDICAID

## 2022-08-13 ENCOUNTER — HOSPITAL ENCOUNTER (EMERGENCY)
Facility: HOSPITAL | Age: 32
Discharge: HOME | End: 2022-08-13
Attending: EMERGENCY MEDICINE
Payer: MEDICAID

## 2022-08-13 VITALS
OXYGEN SATURATION: 99 % | BODY MASS INDEX: 34.93 KG/M2 | SYSTOLIC BLOOD PRESSURE: 105 MMHG | HEART RATE: 90 BPM | TEMPERATURE: 98 F | RESPIRATION RATE: 20 BRPM | WEIGHT: 185 LBS | HEIGHT: 61 IN | DIASTOLIC BLOOD PRESSURE: 72 MMHG

## 2022-08-13 DIAGNOSIS — N20.0 KIDNEY STONE: Primary | ICD-10-CM

## 2022-08-13 DIAGNOSIS — N13.4 HYDROURETER: ICD-10-CM

## 2022-08-13 DIAGNOSIS — N23 RENAL COLIC: ICD-10-CM

## 2022-08-13 LAB
ALBUMIN SERPL-MCNC: 3.5 G/DL (ref 3.4–5)
ALBUMIN/GLOB SERPL: 0.8 {RATIO} (ref 1–2)
ALP LIVER SERPL-CCNC: 80 U/L
ALT SERPL-CCNC: 22 U/L
ANION GAP SERPL CALC-SCNC: 6 MMOL/L (ref 0–18)
AST SERPL-CCNC: 24 U/L (ref 15–37)
BASOPHILS # BLD AUTO: 0.06 X10(3) UL (ref 0–0.2)
BASOPHILS NFR BLD AUTO: 0.7 %
BILIRUB SERPL-MCNC: 0.5 MG/DL (ref 0.1–2)
BILIRUB UR QL STRIP.AUTO: NEGATIVE
BUN BLD-MCNC: 12 MG/DL (ref 7–18)
CALCIUM BLD-MCNC: 8.9 MG/DL (ref 8.5–10.1)
CHLORIDE SERPL-SCNC: 107 MMOL/L (ref 98–112)
CLARITY UR REFRACT.AUTO: CLEAR
CO2 SERPL-SCNC: 23 MMOL/L (ref 21–32)
COLOR UR AUTO: YELLOW
CREAT BLD-MCNC: 1.08 MG/DL
EOSINOPHIL # BLD AUTO: 0.02 X10(3) UL (ref 0–0.7)
EOSINOPHIL NFR BLD AUTO: 0.2 %
ERYTHROCYTE [DISTWIDTH] IN BLOOD BY AUTOMATED COUNT: 12.9 %
GFR SERPLBLD BASED ON 1.73 SQ M-ARVRAT: 70 ML/MIN/1.73M2 (ref 60–?)
GLOBULIN PLAS-MCNC: 4.3 G/DL (ref 2.8–4.4)
GLUCOSE BLD-MCNC: 89 MG/DL (ref 70–99)
GLUCOSE UR STRIP.AUTO-MCNC: NEGATIVE MG/DL
HCT VFR BLD AUTO: 42.2 %
HGB BLD-MCNC: 14.2 G/DL
IMM GRANULOCYTES # BLD AUTO: 0.02 X10(3) UL (ref 0–1)
IMM GRANULOCYTES NFR BLD: 0.2 %
KETONES UR STRIP.AUTO-MCNC: NEGATIVE MG/DL
LYMPHOCYTES # BLD AUTO: 1.1 X10(3) UL (ref 1–4)
LYMPHOCYTES NFR BLD AUTO: 12 %
MCH RBC QN AUTO: 31.6 PG (ref 26–34)
MCHC RBC AUTO-ENTMCNC: 33.6 G/DL (ref 31–37)
MCV RBC AUTO: 93.8 FL
MONOCYTES # BLD AUTO: 0.6 X10(3) UL (ref 0.1–1)
MONOCYTES NFR BLD AUTO: 6.6 %
NEUTROPHILS # BLD AUTO: 7.34 X10 (3) UL (ref 1.5–7.7)
NEUTROPHILS # BLD AUTO: 7.34 X10(3) UL (ref 1.5–7.7)
NEUTROPHILS NFR BLD AUTO: 80.3 %
NITRITE UR QL STRIP.AUTO: NEGATIVE
OSMOLALITY SERPL CALC.SUM OF ELEC: 281 MOSM/KG (ref 275–295)
PH UR STRIP.AUTO: 7 [PH] (ref 5–8)
PLATELET # BLD AUTO: 239 10(3)UL (ref 150–450)
POTASSIUM SERPL-SCNC: 4.4 MMOL/L (ref 3.5–5.1)
PROT SERPL-MCNC: 7.8 G/DL (ref 6.4–8.2)
PROT UR STRIP.AUTO-MCNC: NEGATIVE MG/DL
RBC # BLD AUTO: 4.5 X10(6)UL
SODIUM SERPL-SCNC: 136 MMOL/L (ref 136–145)
SP GR UR STRIP.AUTO: 1.02 (ref 1–1.03)
UROBILINOGEN UR STRIP.AUTO-MCNC: 0.2 MG/DL
WBC # BLD AUTO: 9.1 X10(3) UL (ref 4–11)

## 2022-08-13 PROCEDURE — 96374 THER/PROPH/DIAG INJ IV PUSH: CPT

## 2022-08-13 PROCEDURE — 74176 CT ABD & PELVIS W/O CONTRAST: CPT | Performed by: EMERGENCY MEDICINE

## 2022-08-13 PROCEDURE — 80053 COMPREHEN METABOLIC PANEL: CPT | Performed by: EMERGENCY MEDICINE

## 2022-08-13 PROCEDURE — 87086 URINE CULTURE/COLONY COUNT: CPT | Performed by: EMERGENCY MEDICINE

## 2022-08-13 PROCEDURE — 96361 HYDRATE IV INFUSION ADD-ON: CPT

## 2022-08-13 PROCEDURE — 81015 MICROSCOPIC EXAM OF URINE: CPT | Performed by: EMERGENCY MEDICINE

## 2022-08-13 PROCEDURE — 80053 COMPREHEN METABOLIC PANEL: CPT

## 2022-08-13 PROCEDURE — 85025 COMPLETE CBC W/AUTO DIFF WBC: CPT

## 2022-08-13 PROCEDURE — 99284 EMERGENCY DEPT VISIT MOD MDM: CPT

## 2022-08-13 PROCEDURE — 81015 MICROSCOPIC EXAM OF URINE: CPT

## 2022-08-13 PROCEDURE — 81001 URINALYSIS AUTO W/SCOPE: CPT

## 2022-08-13 PROCEDURE — 96375 TX/PRO/DX INJ NEW DRUG ADDON: CPT

## 2022-08-13 PROCEDURE — 85025 COMPLETE CBC W/AUTO DIFF WBC: CPT | Performed by: EMERGENCY MEDICINE

## 2022-08-13 RX ORDER — HYDROCODONE BITARTRATE AND ACETAMINOPHEN 5; 325 MG/1; MG/1
1-2 TABLET ORAL EVERY 4 HOURS PRN
Qty: 20 TABLET | Refills: 0 | Status: SHIPPED | OUTPATIENT
Start: 2022-08-13 | End: 2022-08-20

## 2022-08-13 RX ORDER — MORPHINE SULFATE 4 MG/ML
4 INJECTION, SOLUTION INTRAMUSCULAR; INTRAVENOUS ONCE
Status: DISCONTINUED | OUTPATIENT
Start: 2022-08-13 | End: 2022-08-13

## 2022-08-13 RX ORDER — KETOROLAC TROMETHAMINE 15 MG/ML
15 INJECTION, SOLUTION INTRAMUSCULAR; INTRAVENOUS ONCE
Status: COMPLETED | OUTPATIENT
Start: 2022-08-13 | End: 2022-08-13

## 2022-08-13 RX ORDER — ONDANSETRON 4 MG/1
4 TABLET, ORALLY DISINTEGRATING ORAL EVERY 4 HOURS PRN
Qty: 10 TABLET | Refills: 0 | Status: SHIPPED | OUTPATIENT
Start: 2022-08-13 | End: 2022-08-20

## 2022-08-13 RX ORDER — TAMSULOSIN HYDROCHLORIDE 0.4 MG/1
0.4 CAPSULE ORAL DAILY
Qty: 7 CAPSULE | Refills: 0 | Status: SHIPPED | OUTPATIENT
Start: 2022-08-13 | End: 2022-09-12

## 2022-08-13 RX ORDER — ONDANSETRON 2 MG/ML
4 INJECTION INTRAMUSCULAR; INTRAVENOUS ONCE
Status: COMPLETED | OUTPATIENT
Start: 2022-08-13 | End: 2022-08-13

## 2022-08-13 NOTE — ED INITIAL ASSESSMENT (HPI)
Pt arrives ambulatory to triage, states she is having L abdominal/flank pain. Has hx of kidney stones, has vomited multiple times today. A&O x4.

## 2022-11-07 NOTE — OPERATIVE REPORT
URETEROSCOPY OPERATIVE NOTE    PATIENT NAME: Barak Katelin OF BIRTH: 6/15/1990  DATE OF SERVICE: 8/24/2021    SURGEON:  Emma Marshall MD    ASSISTANT:  None    PREOPERATIVE DIAGNOSIS:  Left ureteral calculi with hydronephrosis    POSTOPERATIV given the perioperative antibiotics listed above prior to the procedure. The patient was transferred to the dorsal lithotomy position and prepped and draped in the normal sterile fashion using betadine.   We then performed an operative time out to confirm calcifications visible throughout, as is consistent with her history of medullary sponge kidney.      Once I was satisfied that all sizeable fragments had been evacuated, I backed the scope down the ureter and ensured no additional ureteral fragments persis TTE reviewed, hyperdynamic LV function with LVH  Given encephalopathy did not improve s/p UTI treatment, likely due to Wernicke's -- f/u MRI brain and continue IV thiamine  B12 and folate WNL  RPR is negative

## 2022-11-10 ENCOUNTER — TELEMEDICINE (OUTPATIENT)
Dept: FAMILY MEDICINE CLINIC | Facility: CLINIC | Age: 32
End: 2022-11-10
Payer: MEDICAID

## 2022-11-10 DIAGNOSIS — N20.0 RECURRENT KIDNEY STONES: Primary | ICD-10-CM

## 2022-11-10 PROCEDURE — 99214 OFFICE O/P EST MOD 30 MIN: CPT | Performed by: PHYSICIAN ASSISTANT

## 2022-11-10 RX ORDER — HYDROCODONE BITARTRATE AND ACETAMINOPHEN 5; 325 MG/1; MG/1
1 TABLET ORAL EVERY 6 HOURS PRN
Qty: 30 TABLET | Refills: 0 | Status: SHIPPED | OUTPATIENT
Start: 2022-11-10

## 2022-11-10 RX ORDER — TAMSULOSIN HYDROCHLORIDE 0.4 MG/1
0.4 CAPSULE ORAL DAILY
Qty: 30 CAPSULE | Refills: 0 | Status: SHIPPED | OUTPATIENT
Start: 2022-11-10 | End: 2022-12-10

## 2022-11-10 RX ORDER — NAPROXEN 500 MG/1
500 TABLET ORAL 2 TIMES DAILY WITH MEALS
Qty: 30 TABLET | Refills: 2 | Status: SHIPPED | OUTPATIENT
Start: 2022-11-10

## 2022-11-14 ENCOUNTER — HOSPITAL ENCOUNTER (EMERGENCY)
Facility: HOSPITAL | Age: 32
Discharge: HOME | End: 2022-11-14
Attending: EMERGENCY MEDICINE
Payer: MEDICAID

## 2022-11-14 ENCOUNTER — APPOINTMENT (OUTPATIENT)
Dept: GENERAL RADIOLOGY | Facility: HOSPITAL | Age: 32
End: 2022-11-14
Attending: EMERGENCY MEDICINE
Payer: MEDICAID

## 2022-11-14 VITALS
HEART RATE: 98 BPM | WEIGHT: 175 LBS | TEMPERATURE: 99 F | BODY MASS INDEX: 33.04 KG/M2 | OXYGEN SATURATION: 100 % | SYSTOLIC BLOOD PRESSURE: 121 MMHG | RESPIRATION RATE: 16 BRPM | DIASTOLIC BLOOD PRESSURE: 75 MMHG | HEIGHT: 61 IN

## 2022-11-14 DIAGNOSIS — J02.0 STREPTOCOCCAL SORE THROAT: Primary | ICD-10-CM

## 2022-11-14 LAB
B-HCG UR QL: NEGATIVE
SARS-COV-2 RNA RESP QL NAA+PROBE: NOT DETECTED

## 2022-11-14 PROCEDURE — 71046 X-RAY EXAM CHEST 2 VIEWS: CPT | Performed by: EMERGENCY MEDICINE

## 2022-11-14 PROCEDURE — 99283 EMERGENCY DEPT VISIT LOW MDM: CPT

## 2022-11-14 PROCEDURE — 81025 URINE PREGNANCY TEST: CPT

## 2022-11-14 PROCEDURE — 87430 STREP A AG IA: CPT | Performed by: EMERGENCY MEDICINE

## 2022-11-14 PROCEDURE — 99284 EMERGENCY DEPT VISIT MOD MDM: CPT

## 2022-11-14 RX ORDER — CLARITHROMYCIN 500 MG/1
500 TABLET, COATED ORAL 2 TIMES DAILY
Qty: 20 TABLET | Refills: 0 | Status: SHIPPED | OUTPATIENT
Start: 2022-11-14 | End: 2022-11-24

## 2022-11-14 NOTE — ED INITIAL ASSESSMENT (HPI)
Pt presents to the ED with complaints of not feeling well x 2 weeks. Pt c/o cough and sore throat, losing voice. Pt awake and alert, skin w/d,resps appear unlabored. Voice hoarse.

## 2022-11-14 NOTE — DISCHARGE INSTRUCTIONS
Encourage plenty of fluids at home  Rest at home  Motrin or tylenol for symptoms at home  Return to the ER if symptoms worsen or if any other problems arise

## 2022-11-17 ENCOUNTER — MED REC SCAN ONLY (OUTPATIENT)
Dept: FAMILY MEDICINE CLINIC | Facility: CLINIC | Age: 32
End: 2022-11-17

## 2022-11-29 ENCOUNTER — TELEPHONE (OUTPATIENT)
Dept: FAMILY MEDICINE CLINIC | Facility: CLINIC | Age: 32
End: 2022-11-29

## 2022-11-30 NOTE — TELEPHONE ENCOUNTER
ALDEN FROM [facility] [phone] [id number]     Left information on confidential VM.

## 2023-06-23 ENCOUNTER — TELEMEDICINE (OUTPATIENT)
Dept: FAMILY MEDICINE CLINIC | Facility: CLINIC | Age: 33
End: 2023-06-23

## 2023-06-23 DIAGNOSIS — K21.9 GASTROESOPHAGEAL REFLUX DISEASE, UNSPECIFIED WHETHER ESOPHAGITIS PRESENT: Primary | ICD-10-CM

## 2023-06-23 PROCEDURE — 99213 OFFICE O/P EST LOW 20 MIN: CPT | Performed by: FAMILY MEDICINE

## 2023-06-23 RX ORDER — PANTOPRAZOLE SODIUM 40 MG/1
40 TABLET, DELAYED RELEASE ORAL
Qty: 30 TABLET | Refills: 1 | Status: SHIPPED | OUTPATIENT
Start: 2023-06-23

## 2023-08-25 ENCOUNTER — OFFICE VISIT (OUTPATIENT)
Dept: SURGERY | Facility: CLINIC | Age: 33
End: 2023-08-25

## 2023-08-25 DIAGNOSIS — N20.0 RECURRENT NEPHROLITHIASIS: Primary | ICD-10-CM

## 2023-08-25 DIAGNOSIS — R31.29 MICROSCOPIC HEMATURIA: ICD-10-CM

## 2023-08-25 DIAGNOSIS — N20.0 NEPHROLITHIASIS: ICD-10-CM

## 2023-08-25 DIAGNOSIS — Q61.5 MEDULLARY SPONGE KIDNEY OF BOTH KIDNEYS: ICD-10-CM

## 2023-08-25 LAB
APPEARANCE: CLEAR
BILIRUBIN: NEGATIVE
GLUCOSE (URINE DIPSTICK): NEGATIVE MG/DL
KETONES (URINE DIPSTICK): NEGATIVE MG/DL
LEUKOCYTES: NEGATIVE
MULTISTIX LOT#: ABNORMAL NUMERIC
NITRITE, URINE: NEGATIVE
PH, URINE: 6 (ref 4.5–8)
PROTEIN (URINE DIPSTICK): NEGATIVE MG/DL
SPECIFIC GRAVITY: 1.02 (ref 1–1.03)
URINE-COLOR: YELLOW
UROBILINOGEN,SEMI-QN: 0.2 MG/DL (ref 0–1.9)

## 2023-08-25 PROCEDURE — 81003 URINALYSIS AUTO W/O SCOPE: CPT

## 2023-08-25 PROCEDURE — 99204 OFFICE O/P NEW MOD 45 MIN: CPT

## 2023-08-25 RX ORDER — TAMSULOSIN HYDROCHLORIDE 0.4 MG/1
CAPSULE ORAL
COMMUNITY
Start: 2023-04-29

## 2023-08-25 RX ORDER — ONDANSETRON 4 MG/1
TABLET, ORALLY DISINTEGRATING ORAL
COMMUNITY
Start: 2023-04-29

## 2023-08-25 NOTE — PATIENT INSTRUCTIONS
General Recommendations to Avoid Future Kidney Stones    1. Drink enough water to produce 2 liters of clear urine daily. You may need to use a container at first to measure how much you are actually producing and increase your intake accordingly. Try to start the day off with a large glass of water as we all wake up dehydrated in the morning. 2. Add lemon or lime juice to your water. These juices contain citrate which naturally inhibits stone formation. An easy way to do this is using sugar free lemonade mix (ie Crystal Light or True Lemon (if you prefer to avoid aspartame))    3. Avoid salty foods such as prepackaged and fast foods, and do not add salt to foods. Try to limit sodium intake to 2500mg maximum. 4. Decrease phosphorus (soda) and caffeine. 5. Limit intake of the following group of foods to one serving daily: spinach, nuts (especially almonds), tea (especially black tea), chocolate, and potatoes. 6. Limit intake of Vitamin C supplements to less than 1000 mg daily. 7. Limit intake of animal protein (beef, chicken, turkey, fish, pork) to one serving daily. A good rule for portion size is no more meat than will fit in the palm of your hand. 8. Limit roasted nuts to 2-3 servings per week. 9.  Maintain 1-2 servings of dairy products daily (1 serving = 1 glass of milk, 2 slices of cheese, 1 scoop of ice cream, or 1 cup of yogurt). Try to decrease cheese intake as it may increase kidney stone risk compared to other dairy products. Do not eliminate calcium from your diet as it is necessary for bone health. -Females should try not to exceed 500mg per day  -Males should try not to exceed 750mg per day    10. Take a daily B complex vitamin or 100mg vitamin B6 daily. This may help to decrease oxalate in the urine. 11. Take a daily magnesium supplement of 300mg daily. Magnesium binds to oxalate in the same way that calcium does but is more soluble.   Magnesium will take the place of calcium when binding with oxalate and be less prone to form stones. 12. Eat a low fat diet and exercise at least 30 minutes 3 times per week to try and maintain your ideal body weight. Being overweight is a risk factor for kidney stones too!

## 2023-08-25 NOTE — PROGRESS NOTES
[facility], [address], [address] [facility]    Urology Consult Note    History of Present Illness:   Patient is a(n) 29year old female with hx of sponge kidney and recurrent kidney stones presents for kidney stone consult. Pt c/o right flank pain for the past week or so, which feels similar to her past stones. No nausea, vomiting, fever, chills. No urinary symptoms but this is normal for her even when she has a UTI. Unsure if UTIs are frequent as she is typically asx but will randomly test positive for culture. She last had a ureteroscopy stone removal 8/20/2021 with Dr. Cris Rubinstein. Stone path: 60% calc ox mono, 30% calc ox dihydro, 10% calc phos. Before and after that she has spontaneously passed some stones. No path for these. Each time feeling nausea, vomiting, and flank pain prior. Typically no fever, chills, or urinary symptoms. No other surgical intervention has been needed. No hx of 24h urine. Drinks 64oz+ water a day, no caffiene, soda, tea. UA today trace blood, otherwise neg. HISTORY:  Past Medical History:   Diagnosis Date    Calculus of kidney     2 years ago    Gonococcal infection (acute) of lower genitourinary tract     Kidney stones     Proteinuria     Renal disorder     Vaginitis and vulvovaginitis, unspecified       Past Surgical History:   Procedure Laterality Date    [facility] COSMETIC SURGERY      liposuction    OTHER  06/2021    BBL cosmetic procedure    OTHER SURGICAL HISTORY  08/24/2021    Cysto, Lt RPG, Lt URS, stone removal, Lt stent.  Dr. Belinda Howard  09/01/2021    Cysto/stent removal. Dr. Cris Rubinstein      Family History   Problem Relation Age of Onset    Cancer Paternal Grandmother         Lung     Diabetes Maternal Grandfather     Hypertension Maternal Grandfather       Social History:   Social History     Socioeconomic History    Marital status: Single   Tobacco Use    Smoking status: Never    Smokeless tobacco: Never   Vaping Use    Vaping Use: Never used   Substance and Sexual Activity    Alcohol use: No     Alcohol/week: 0.0 standard drinks of alcohol     Comment: rarely    Drug use: No    Sexual activity: Yes     Partners: Male   Other Topics Concern    Caffeine Concern Yes    Exercise No        Allergies  No Known Allergies    Review of Systems:   A 10-point review of systems was completed and is negative other than as noted above.     Physical Exam:   LMP 10/14/2022 (Approximate)     GENERAL APPEARANCE: no acute distress  NEUROLOGIC: converses appropriately  HEAD: atraumatic, normocephalic  LUNGS: non-labored breathing  ABDOMEN: soft, nontender, non-distended  BACK: no CVA tenderness  PSYCH: appropriate affect and mood    Results:     Laboratory Data:  Lab Results   Component Value Date    WBC 9.1 08/13/2022    HGB 14.2 08/13/2022    .0 08/13/2022     Lab Results   Component Value Date     08/13/2022    K 4.4 08/13/2022     08/13/2022    CO2 23.0 08/13/2022    BUN 12 08/13/2022    GLU 89 08/13/2022    GFRAA 98 05/24/2022    AST 24 08/13/2022    ALT 22 08/13/2022    TP 7.8 08/13/2022    ALB 3.5 08/13/2022    CA 8.9 08/13/2022    MG 2.1 03/24/2021       Urinalysis Results (last 3 years):  Recent Labs     03/23/21  0846 07/18/21  0047 08/23/21  1905 02/10/22  1736 05/24/22  1024 08/13/22  1321 08/25/23  0836   COLORUR Yellow Yellow Yellow Yellow Yellow Yellow  --    CLARITY Cloudy* Hazy* Hazy* Clear Clear Clear  --    SPECGRAVITY 1.015 1.016 1.013 1.014 1.009 1.020 1.025   PHURINE 6.0 6.0 8.0 6.0 6.0 7.0 6.0   PROUR 100* Negative Negative Negative Negative Negative  --    GLUUR Negative Negative Negative Negative Negative Negative  --    KETUR Trace* Negative Trace* Negative Negative Negative  --    BILUR Negative Negative Negative Negative Negative Negative  --    BLOODURINE Moderate* Large* Negative Small* Small* Small*  --    NITRITE Positive* Negative Negative Negative Negative Negative Negative   UROBILINOGEN <2.0 <2.0 <2.0 <2.0 <2.0 0.2 --    LEUUR Large* Moderate* Negative Trace* Small* Trace*  --    WBCUR >50* 11-20*  --  1-5 6-10* 6-10*  6-10*  --    RBCUR >10* 6-10*  --  0-2 3-5* 3-5*  3-5*  --    BACUR 2+* Rare*  --  None Seen Rare* None Seen  None Seen  --        Urine Culture Results (last 3 years):  Lab Results   Component Value Date    URINECUL No Growth at 18-24 hrs. 08/13/2022    URINECUL No Growth at 18-24 hrs. 05/24/2022    URINECUL  02/10/2022     <10,000 cfu/ml Mixture of Gram positive organisms isolated - probable contamination. URINECUL No Growth at 18-24 hrs. 07/18/2021    URINECUL >100,000 CFU/ML Escherichia coli (A) 03/23/2021    URINECUL >100,000 CFU/ML Escherichia coli (A) 06/23/2020    URINECUL >100,000 CFU/ML Escherichia coli (A) 03/05/2020    URINECUL No Growth at 18-24 hrs. 07/23/2019    URINECUL >100,000 CFU/ML Escherichia coli (A) 07/12/2019    URINECUL  06/21/2019     <10,000 cfu/ml Mixture of Gram positive organisms isolated - probable contamination. URINECUL No Growth at 18-24 hrs. 06/18/2019    URINECUL No Growth 1 Day 07/21/2016    URINECUL No Growth 1 Day 05/11/2016    URINECUL No Growth 1 Day 05/02/2016    URINECUL >100,000 CFU/ML Escherichia coli (A) 05/21/2015       Imaging  No results found. Impression:   Recommendations:  Recurrent stones  - discussed stone prevention strategies and reviewed educational materials for this  - CT stones   - if no obstructing stones, pt can do 24h urine and will have more recommendations when results come back  - culture to r/o infxn since shs is typically asx  - pt would like stones removed at some point even if no obstructive stones present and I discussed risks including, but not limited to, bleeding, infection, damage to surrounding structures, need for repeat procedure(s)    Thank you very much for this consult. Please call if there are any questions or concerns.      Mary Anne Dowling PA-C  Urology  [facility]  Phone: [phone]    Date: 8/25/2023 Time: 8:55 AM

## 2023-10-05 ENCOUNTER — TELEPHONE (OUTPATIENT)
Dept: SURGERY | Facility: CLINIC | Age: 33
End: 2023-10-05

## 2023-10-05 RX ORDER — TAMSULOSIN HYDROCHLORIDE 0.4 MG/1
CAPSULE ORAL
Qty: 30 CAPSULE | Refills: 0 | OUTPATIENT
Start: 2023-10-05

## 2023-10-05 NOTE — TELEPHONE ENCOUNTER
Pt had surgery yesterday in [place] to remove some kidney stones - asking for rx for Flomax 0.4 mg for 30 days - to be sent to [facility] in [place] -      who did her surgery - Dr Nancy Ibrahim   Phone - [phone]

## 2023-10-10 RX ORDER — TAMSULOSIN HYDROCHLORIDE 0.4 MG/1
0.4 CAPSULE ORAL EVERY EVENING
Qty: 90 CAPSULE | Refills: 0 | Status: SHIPPED | OUTPATIENT
Start: 2023-10-10

## 2023-10-16 ENCOUNTER — TELEPHONE (OUTPATIENT)
Dept: SURGERY | Facility: CLINIC | Age: 33
End: 2023-10-16

## 2023-10-17 ENCOUNTER — OFFICE VISIT (OUTPATIENT)
Dept: SURGERY | Facility: CLINIC | Age: 33
End: 2023-10-17
Payer: MEDICAID

## 2023-10-17 DIAGNOSIS — N20.0 RECURRENT KIDNEY STONES: Primary | ICD-10-CM

## 2023-10-17 DIAGNOSIS — R82.90 URINE FINDING: ICD-10-CM

## 2023-10-17 LAB
BILIRUBIN: NEGATIVE
GLUCOSE (URINE DIPSTICK): NEGATIVE MG/DL
KETONES (URINE DIPSTICK): NEGATIVE MG/DL
MULTISTIX LOT#: ABNORMAL NUMERIC
NITRITE, URINE: NEGATIVE
PH, URINE: 7 (ref 4.5–8)
PROTEIN (URINE DIPSTICK): 100 MG/DL
SPECIFIC GRAVITY: 1.02 (ref 1–1.03)
URINE-COLOR: YELLOW
UROBILINOGEN,SEMI-QN: 0.2 MG/DL (ref 0–1.9)

## 2023-10-17 PROCEDURE — 81003 URINALYSIS AUTO W/O SCOPE: CPT

## 2023-10-17 PROCEDURE — 99214 OFFICE O/P EST MOD 30 MIN: CPT

## 2023-10-17 RX ORDER — PHENAZOPYRIDINE HYDROCHLORIDE 100 MG/1
100 TABLET, FILM COATED ORAL 3 TIMES DAILY PRN
Qty: 30 TABLET | Refills: 0 | Status: SHIPPED | OUTPATIENT
Start: 2023-10-17

## 2023-10-17 RX ORDER — OXYBUTYNIN CHLORIDE 5 MG/1
5 TABLET, EXTENDED RELEASE ORAL DAILY
Qty: 90 TABLET | Refills: 6 | Status: SHIPPED | OUTPATIENT
Start: 2023-10-17

## 2023-10-17 NOTE — PROGRESS NOTES
HPI:   Dandre Rolle is a(n) 29year old female patient with a hx of sponge kidney and recurrent kidney stones who presents for follow up. Pt initially seen by me on 8/25/23 with some right flank pain. I ordered a CT A/P at the time but pt was unable to get it done before she went to ED for worsening flank pain at [facility] in [place], where she goes to school. CT in ED showed 6.3mm obstructing stone in the prximal right ureter and 8.1mm in mid right ureter with mild to moderate hydronephrosis. There is also extensive scattered nonobstructing stones throughout both kidneys predominantly in the medually pyramids consistent with medullary nephrocalcinosis. Pt had cystoscopy, right RPG, ureteral stent placement, ESWL right ureteral and renal calculi on 10/4/23. She tried getting stent removal with original urologist Dr. Mccauley Friday in [place] [address], but was told she needed additional procedure and decided to transfer her care back here. Currently has a stent in. C/o pain, gross hematuria, and occasional dysuria. HISTORY:  Past Medical History:   Diagnosis Date    Calculus of kidney     2 years ago    Gonococcal infection (acute) of lower genitourinary tract     Kidney stones     Proteinuria     Renal disorder     Vaginitis and vulvovaginitis, unspecified       Past Surgical History:   Procedure Laterality Date    [facility] COSMETIC SURGERY      liposuction    OTHER  06/2021    BBL cosmetic procedure    OTHER SURGICAL HISTORY  08/24/2021    Cysto, Lt RPG, Lt URS, stone removal, Lt stent.  Dr. Stephen Hurt  09/01/2021    Cysto/stent removal. Dr. Swapnil Page      Family History   Problem Relation Age of Onset    Cancer Paternal Grandmother         Lung     Diabetes Maternal Grandfather     Hypertension Maternal Grandfather       Social History:   Social History     Socioeconomic History    Marital status: Single   Tobacco Use    Smoking status: Never    Smokeless tobacco: Never   Vaping Use Vaping Use: Never used   Substance and Sexual Activity    Alcohol use: No     Alcohol/week: 0.0 standard drinks of alcohol     Comment: rarely    Drug use: No    Sexual activity: Yes     Partners: Male   Other Topics Concern    Caffeine Concern Yes    Exercise No        Medications (Active prior to today's visit):  Current Outpatient Medications   Medication Sig Dispense Refill    phenazopyridine (PYRIDIUM) 100 MG Oral Tab Take 1 tablet (100 mg total) by mouth 3 (three) times daily as needed for Pain. This will turn your urine orange. 30 tablet 0    oxybutynin ER 5 MG Oral Tablet 24 Hr Take 1 tablet (5 mg total) by mouth daily. 90 tablet 6    tamsulosin 0.4 MG Oral Cap Take 1 capsule (0.4 mg total) by mouth every evening. 90 capsule 0    ondansetron 4 MG Oral Tablet Dispersible       tamsulosin 0.4 MG Oral Cap       pantoprazole 40 MG Oral Tab EC Take 1 tablet (40 mg total) by mouth every morning before breakfast. 30 tablet 1    HYDROcodone-acetaminophen 5-325 MG Oral Tab Take 1 tablet by mouth every 6 (six) hours as needed for Pain. 30 tablet 0    naproxen 500 MG Oral Tab Take 1 tablet (500 mg total) by mouth 2 (two) times daily with meals. 30 tablet 2    traMADol 50 MG Oral Tab Take 1 tablet (50 mg total) by mouth every 6 (six) hours as needed for Pain. 15 tablet 0       Allergies:  No Known Allergies    ROS:     A comprehensive 10 point review of systems was completed. Pertinent positives and negatives noted in the the HPI.     PHYSICAL EXAM:     GENERAL APPEARANCE: well, developed, well nourished, in no acute distress  EARS: hearing intact  LUNGS: nonlabored breathing  ABDOMEN: soft, no obvious masses or tenderness, no CVA tenderness     ASSESSMENT/PLAN:   Renal stones  - discussed options of removing stent now due to discomfort vs. Getting imaging and seeing if pt maybe needs repeat procedure for stone burden, in which case we'd do a stent exchange and she was agreeable to getting more imaging first to see what next step should be  - oxybutynin and pyridium sent for sx management; can continue flomax too  - increase water intake in the meantime  - 24h urine should be done eventually     The above assessment and plan were discussed in detail with the patient who verbalized understanding and all questions were answered. A total of 30 minutes were spent on the visit including chart review in preparation for the visit, time with the patient, placing orders and communication with other providers where appropriate.     John George PA-C  Urology  [place] [address]  Office: [phone]

## 2023-10-19 ENCOUNTER — LAB ENCOUNTER (OUTPATIENT)
Dept: LAB | Age: 33
End: 2023-10-19
Attending: Other
Payer: MEDICAID

## 2023-10-19 ENCOUNTER — OFFICE VISIT (OUTPATIENT)
Dept: NEUROLOGY | Facility: CLINIC | Age: 33
End: 2023-10-19
Payer: MEDICAID

## 2023-10-19 VITALS
SYSTOLIC BLOOD PRESSURE: 106 MMHG | WEIGHT: 181.19 LBS | DIASTOLIC BLOOD PRESSURE: 68 MMHG | BODY MASS INDEX: 34 KG/M2 | HEART RATE: 80 BPM | RESPIRATION RATE: 16 BRPM

## 2023-10-19 DIAGNOSIS — R42 DIZZINESS: ICD-10-CM

## 2023-10-19 DIAGNOSIS — F41.9 ANXIETY: ICD-10-CM

## 2023-10-19 LAB
ALBUMIN SERPL-MCNC: 3.4 G/DL (ref 3.4–5)
ALBUMIN/GLOB SERPL: 0.8 {RATIO} (ref 1–2)
ALP LIVER SERPL-CCNC: 75 U/L
ALT SERPL-CCNC: 36 U/L
ANION GAP SERPL CALC-SCNC: 4 MMOL/L (ref 0–18)
AST SERPL-CCNC: 21 U/L (ref 15–37)
BASOPHILS # BLD AUTO: 0.08 X10(3) UL (ref 0–0.2)
BASOPHILS NFR BLD AUTO: 1.1 %
BILIRUB SERPL-MCNC: 0.4 MG/DL (ref 0.1–2)
BUN BLD-MCNC: 9 MG/DL (ref 7–18)
CALCIUM BLD-MCNC: 8.6 MG/DL (ref 8.5–10.1)
CHLORIDE SERPL-SCNC: 106 MMOL/L (ref 98–112)
CO2 SERPL-SCNC: 27 MMOL/L (ref 21–32)
CREAT BLD-MCNC: 0.82 MG/DL
EGFRCR SERPLBLD CKD-EPI 2021: 97 ML/MIN/1.73M2 (ref 60–?)
EOSINOPHIL # BLD AUTO: 0.26 X10(3) UL (ref 0–0.7)
EOSINOPHIL NFR BLD AUTO: 3.5 %
ERYTHROCYTE [DISTWIDTH] IN BLOOD BY AUTOMATED COUNT: 13.2 %
FASTING STATUS PATIENT QL REPORTED: NO
GLOBULIN PLAS-MCNC: 4.4 G/DL (ref 2.8–4.4)
GLUCOSE BLD-MCNC: 83 MG/DL (ref 70–99)
HCT VFR BLD AUTO: 42.6 %
HGB BLD-MCNC: 14.3 G/DL
IMM GRANULOCYTES # BLD AUTO: 0.02 X10(3) UL (ref 0–1)
IMM GRANULOCYTES NFR BLD: 0.3 %
LYMPHOCYTES # BLD AUTO: 1.55 X10(3) UL (ref 1–4)
LYMPHOCYTES NFR BLD AUTO: 21 %
MCH RBC QN AUTO: 31.5 PG (ref 26–34)
MCHC RBC AUTO-ENTMCNC: 33.6 G/DL (ref 31–37)
MCV RBC AUTO: 93.8 FL
MONOCYTES # BLD AUTO: 0.4 X10(3) UL (ref 0.1–1)
MONOCYTES NFR BLD AUTO: 5.4 %
NEUTROPHILS # BLD AUTO: 5.07 X10 (3) UL (ref 1.5–7.7)
NEUTROPHILS # BLD AUTO: 5.07 X10(3) UL (ref 1.5–7.7)
NEUTROPHILS NFR BLD AUTO: 68.7 %
OSMOLALITY SERPL CALC.SUM OF ELEC: 282 MOSM/KG (ref 275–295)
PLATELET # BLD AUTO: 309 10(3)UL (ref 150–450)
POTASSIUM SERPL-SCNC: 4.1 MMOL/L (ref 3.5–5.1)
PROT SERPL-MCNC: 7.8 G/DL (ref 6.4–8.2)
RBC # BLD AUTO: 4.54 X10(6)UL
SODIUM SERPL-SCNC: 137 MMOL/L (ref 136–145)
TSI SER-ACNC: 0.68 MIU/ML (ref 0.36–3.74)
VIT B12 SERPL-MCNC: 1732 PG/ML (ref 193–986)
WBC # BLD AUTO: 7.4 X10(3) UL (ref 4–11)

## 2023-10-19 PROCEDURE — 3074F SYST BP LT 130 MM HG: CPT | Performed by: OTHER

## 2023-10-19 PROCEDURE — 3078F DIAST BP <80 MM HG: CPT | Performed by: OTHER

## 2023-10-19 PROCEDURE — 80053 COMPREHEN METABOLIC PANEL: CPT | Performed by: OTHER

## 2023-10-19 PROCEDURE — 36415 COLL VENOUS BLD VENIPUNCTURE: CPT | Performed by: OTHER

## 2023-10-19 PROCEDURE — 85025 COMPLETE CBC W/AUTO DIFF WBC: CPT | Performed by: OTHER

## 2023-10-19 PROCEDURE — 99204 OFFICE O/P NEW MOD 45 MIN: CPT | Performed by: OTHER

## 2023-10-19 PROCEDURE — 84443 ASSAY THYROID STIM HORMONE: CPT | Performed by: OTHER

## 2023-10-19 PROCEDURE — 82607 VITAMIN B-12: CPT | Performed by: OTHER

## 2023-10-19 RX ORDER — VENLAFAXINE HYDROCHLORIDE 37.5 MG/1
37.5 CAPSULE, EXTENDED RELEASE ORAL DAILY
Qty: 30 CAPSULE | Refills: 2 | Status: SHIPPED | OUTPATIENT
Start: 2023-10-19

## 2023-10-19 NOTE — PROGRESS NOTES
Patient states she has been experiencing dizziness for the past month. Had surgery 2 weeks ago for kidney stones and had stent placed. Was seen in ER yesterday in [place] and had CT Head. Dizziness occurs randomly and feels out of place. Vision seems to be affected. Sometimes feels like things are askew. Gait unsteady at times.

## 2023-10-19 NOTE — PROGRESS NOTES
HPI:    Patient ID: Archie Barnard is a 29year old female. HPI  Amanuel Bazzi is a 29-year-old female who presented for evaluation of dizziness. She has a history of kidney stones and recently had surgery done about 2 weeks ago. No UTI. She was prescribed Flomax, hydrocodone antibiotic which she only took it for couple days and currently not on any medications    States that she has been having intermittent dizziness and lightheadedness for the past 1 month. States it is not vertigo and denies any spinning/tilting sensation,  feels lightheaded vision gets off and she cannot focus. States she does not loses vision or any alteration of awareness. Denies any syncope. States the dizziness is making her anxious then she gets some palpitations. Yesterday she was in [place] at her sister's place and then started having dizzy episode so she went to local ER. CT head performed which was negative for any acute abnormality. States she drinks enough water and also takes Gatorade. HISTORY:  Past Medical History:   Diagnosis Date    Calculus of kidney     2 years ago    Gonococcal infection (acute) of lower genitourinary tract     Kidney stones     Proteinuria     Renal disorder     Vaginitis and vulvovaginitis, unspecified       Past Surgical History:   Procedure Laterality Date    [facility] COSMETIC SURGERY      liposuction    OTHER  06/2021    BBL cosmetic procedure    OTHER SURGICAL HISTORY  08/24/2021    Cysto, Lt RPG, Lt URS, stone removal, Lt stent.  Dr. Anay Sarmiento  09/01/2021    Cysto/stent removal. Dr. Ortiz Drilling Surgery again 10/4/2023      Family History   Problem Relation Age of Onset    Cancer Paternal Grandmother         Lung     Diabetes Maternal Grandfather     Hypertension Maternal Grandfather       Social History     Socioeconomic History    Marital status: Single   Tobacco Use    Smoking status: Never    Smokeless tobacco: Never   Vaping Use    Vaping Use: Never used   Substance and Sexual Activity    Alcohol use: No     Alcohol/week: 0.0 standard drinks of alcohol     Comment: rarely    Drug use: No    Sexual activity: Yes     Partners: Male   Other Topics Concern    Caffeine Concern No    Exercise No        Review of Systems   Constitutional: Negative. HENT: Negative. Eyes: Negative. Respiratory: Negative. Cardiovascular: Negative. Gastrointestinal: Negative. Endocrine: Negative. Genitourinary: Negative. Musculoskeletal: Negative. Skin: Negative. Allergic/Immunologic: Negative. Neurological:  Positive for dizziness. Negative for headaches. Hematological: Negative. Psychiatric/Behavioral: Negative. All other systems reviewed and are negative. Current Outpatient Medications   Medication Sig Dispense Refill    phenazopyridine (PYRIDIUM) 100 MG Oral Tab Take 1 tablet (100 mg total) by mouth 3 (three) times daily as needed for Pain. This will turn your urine orange. (Patient not taking: Reported on 10/19/2023) 30 tablet 0    oxybutynin ER 5 MG Oral Tablet 24 Hr Take 1 tablet (5 mg total) by mouth daily. (Patient not taking: Reported on 10/19/2023) 90 tablet 6    ondansetron 4 MG Oral Tablet Dispersible  (Patient not taking: Reported on 10/19/2023)      tamsulosin 0.4 MG Oral Cap  (Patient not taking: Reported on 10/19/2023)      pantoprazole 40 MG Oral Tab EC Take 1 tablet (40 mg total) by mouth every morning before breakfast. (Patient not taking: Reported on 10/19/2023) 30 tablet 1    HYDROcodone-acetaminophen 5-325 MG Oral Tab Take 1 tablet by mouth every 6 (six) hours as needed for Pain. (Patient not taking: Reported on 10/19/2023) 30 tablet 0    naproxen 500 MG Oral Tab Take 1 tablet (500 mg total) by mouth 2 (two) times daily with meals. (Patient not taking: Reported on 10/19/2023) 30 tablet 2    traMADol 50 MG Oral Tab Take 1 tablet (50 mg total) by mouth every 6 (six) hours as needed for Pain.  (Patient not taking: Reported on 10/19/2023) 15 tablet 0     Allergies:No Known Allergies  PHYSICAL EXAM:   Physical Exam  Blood pressure 106/68, pulse 80, resp. rate 16, weight 181 lb 3.2 oz (82.2 kg), last menstrual period 10/14/2022, not currently breastfeeding. General Appearance: Well nourished, well developed, no apparent distress. HEENT: Normocephalic and atraumatic. Cardiovascular: Normal rate, regular rhythm and normal heart sounds. Pulmonary/Chest: Effort normal and breath sounds normal.   Abdominal: Soft. Bowel sounds are normal.   Skin: dry, clean and intact  Ext: peripheral pulses present  Psych: normal mood and affect    Neurological:  Patient is awake, alert and oriented to person, place and time   Normal memory, attention/concentration, speech and language. Cranial Nerves:   II:  VA normal with glasses. Visual fields: normal  III: Pupils: equal, round, reactive to light  III,IV,VI: Extra Ocular Movements: intact  V: Facial sensation: intact  VII: Facial strength: intact  VIII: Hearing: intact  IX: Palate: intact  XI: Shoulder shrug: intact  XII: Tongue movement: normal    Motor: Normal tone. Strength is  5 out of 5 in all extremities bilaterally. DTR: present    Sensory: Sensory examination is normal to light touch and pinprick     Coordination: Finger-to-nose test normal bilaterally without evidence of dysmetria. Gait: Casual, toe, heel, and tandem gait are normal.          TESTS/IMAGING:     CT head outside facility- 10/19/2023    COMPARISON:None    CT scan was performed without intravenous contrast. Multiplanar reformats are generated. The exam was performed utilizing ALARA(As Low As Reasonably Achievable) techniques and principles to minimize radiation exposure utilizing one more the following: automated exposure control, adjustment of the mA and/or kV according to patient size, or use of iterative reconstruction technique. .    The ventricles, cisterns and cortical sulci have a normal caliber.  There is no abnormal density, midline shift, hemorrhage or extra-axial collection. Gray-white matter differentiation appears normal.      Impression:    Normal noncontrast CT study of the brain. ASSESSMENT/PLAN:     (R42) Dizziness  Plan: CBC W Differential W Platelet [E], Comp         Metabolic Panel (14), TSH W Reflex To Free T4,         Vitamin B12      (F41.9) Anxiety  Plan: venlafaxine ER 37.5 MG Oral Capsule SR 24 Hr          Dizziness and lightheadedness unspecified  At baseline her blood pressure runs low but no orthostatic hypotension    CT head done yesterday at an outside facility was negative for any acute abnormality or sinus disease    Check labs CBC CMP thyroid profile and B12 level    Trial of Venlafaxine and for possible anxiety  Continue adequate hydration and electrolyte intake        Thank you for allowing us to participate in your patient's care. Total 45 minutes spent with patient >50% of visit was spent in counseling and coordination of care      [place]iana Francois MD  [facility] [phone]    This note was prepared using [employer] voice recognition dictation software. As a result errors may occur. When identified these errors have been corrected.  While every attempt is made to correct errors during dictation discrepancies may still exist       Meds This Visit:  Requested Prescriptions      No prescriptions requested or ordered in this encounter       Imaging & Referrals:  None     EK#[id number]

## 2023-10-30 ENCOUNTER — TELEMEDICINE (OUTPATIENT)
Dept: FAMILY MEDICINE CLINIC | Facility: CLINIC | Age: 33
End: 2023-10-30

## 2023-10-30 ENCOUNTER — HOSPITAL ENCOUNTER (OUTPATIENT)
Dept: CT IMAGING | Age: 33
Discharge: HOME | End: 2023-10-30
Payer: MEDICAID

## 2023-10-30 ENCOUNTER — TELEPHONE (OUTPATIENT)
Dept: FAMILY MEDICINE CLINIC | Facility: CLINIC | Age: 33
End: 2023-10-30

## 2023-10-30 DIAGNOSIS — R42 DIZZINESS: Primary | ICD-10-CM

## 2023-10-30 DIAGNOSIS — N20.0 RECURRENT NEPHROLITHIASIS: ICD-10-CM

## 2023-10-30 DIAGNOSIS — R42 DIZZINESS: ICD-10-CM

## 2023-10-30 PROBLEM — K44.9 HIATAL HERNIA: Status: ACTIVE | Noted: 2023-10-30

## 2023-10-30 PROCEDURE — 74176 CT ABD & PELVIS W/O CONTRAST: CPT

## 2023-10-30 RX ORDER — ALPRAZOLAM 0.25 MG/1
0.25 TABLET ORAL EVERY 6 HOURS PRN
Qty: 20 TABLET | Refills: 0 | Status: SHIPPED | OUTPATIENT
Start: 2023-10-30 | End: 2023-10-30

## 2023-10-30 RX ORDER — ALPRAZOLAM 0.25 MG/1
0.25 TABLET ORAL EVERY 6 HOURS PRN
Qty: 20 TABLET | Refills: 0 | Status: SHIPPED | OUTPATIENT
Start: 2023-10-30 | End: 2023-11-06

## 2023-10-30 NOTE — PROGRESS NOTES
Video Visit    Kay Gaytan is a 29year old female. No chief complaint on file. HPI:   Patient presents for follow up of dizziness. So far has been to ER and seen neurology  ER given medication for vertigo which did not help  Neurology prescribed effexor for anxiety but she has not started it yet  She started oil of oregano pills and taking BID and feels like it is helping  She is not as dizzy and vision feels more focused  She is still feeling dizziness constantly - feels like she is out of place, hard to explain  No exacerbating factors  She denies room spinning  Was having headaches and eyes were feeling heavy - gone now  She has been drinking a lot of fluids especially since her kidney stones  Diet has been good, eating regularly  She is tolerating activity fairly well  No tinnitus, hearing loss  No h/o ear infections/allergies  CT brain negative  Labs non-contributory      Current Outpatient Medications   Medication Sig Dispense Refill    ALPRAZolam 0.25 MG Oral Tab Take 1 tablet (0.25 mg total) by mouth every 6 (six) hours as needed. 20 tablet 0      Past Medical History:   Diagnosis Date    Calculus of kidney     2 years ago    Gonococcal infection (acute) of lower genitourinary tract     Kidney stones     Proteinuria     Renal disorder     Vaginitis and vulvovaginitis, unspecified       Past Surgical History:   Procedure Laterality Date    Ehv cosmetic surgery      liposuction    Other  06/2021    BBL cosmetic procedure    Other surgical history  08/24/2021    Cysto, Lt RPG, Lt URS, stone removal, Lt stent.  Dr. Kylie Boswell    Other surgical history  09/01/2021    Cysto/stent removal. Dr. Kylie Boswell Surgery again 10/4/2023      Social History:  Social History     Socioeconomic History    Marital status: Single   Tobacco Use    Smoking status: Never    Smokeless tobacco: Never   Vaping Use    Vaping Use: Never used   Substance and Sexual Activity    Alcohol use: No     Alcohol/week: 0.0 standard drinks of alcohol     Comment: rarely    Drug use: No    Sexual activity: Yes     Partners: Male   Other Topics Concern    Caffeine Concern No    Exercise No        REVIEW OF SYSTEMS:   GENERAL HEALTH: Denies fevers, chills, sweats, and fatigue. EYES: +vision changes   HENT: Denies hearing loss, ear pain, nasal congestion, sinus pain, and sore throat. SKIN: Denies skin lesions and rashes. RESPIRATORY: Denies shortness of breath, wheezing, and cough. CARDIOVASCULAR: Denies chest pain, palpitations, and edema. GI: Denies abdominal pain, heartburn, nausea, vomiting, and diarrhea. : Denies dysuria, hematuria, urinary frequency, change urine color, and discharge. MUSK: Denies back pain, joint pain, neck pain, and myalgias. NEURO: +dizziness  ENDO: Denies polyuria, polydipsia, and tremors. ALLERGY/ASTHMA: Denies asthma and environmental allergies  HEME: Denies anemia, abnormal bleeding, and easy bruising. PSYCH: Denies anxiety and depression. EXAM:   GENERAL: well developed, well nourished, NAD. HEENT: AT/NC. Normal lips, gums, teeth, tongue. LUNGS: Speaking in complete sentences comfortably without increased work of breathing. SKIN: normal mobility and turgor. No rashes or suspicious lesions. PSYCH: Normal mood and affect, A&Ox3. ASSESSMENT AND PLAN:   1. Dizziness  Etiology unclear. CT and labs non-contributory. Unable to examine patient given video visit. Symptoms only better after taking oil of oregano. Since she did not respond with meclizine. Will use second line tx of xanax as directed and have her consult with ENT. Follow up if symptoms worsen or if anything new develops. - ENT Referral - In Network  - ALPRAZolam 0.25 MG Oral Tab; Take 1 tablet (0.25 mg total) by mouth every 6 (six) hours as needed. Dispense: 20 tablet; Refill: 0        The patient indicates understanding of these issues and agrees to the plan. No follow-ups on file.       Stan Ron PA-C  10/30/2023  12:52 PM    Telehealth Verbal Consent   I conducted a telehealth visit with Terese Matthew today, 10/30/23, which was completed using two-way, real-time interactive audio and video communication. This has been done in good alex to provide continuity of care in the best interest of the provider-patient relationship, due to the COVID -19 public health crisis/national emergency where restrictions of face-to-face office visits are ongoing. Every conscious effort was taken to allow for sufficient and adequate time to complete the visit. The patient was made aware of the limitations of the telehealth visit, including treatment limitations as no physical exam could be performed. The patient was advised to call 911 or to go to the ER in case there was an emergency. The patient was also advised of the potential privacy & security concerns related to the telehealth platform. The patient was made aware of where to find [facility] notice of privacy practices, telehealth consent form and other related consent forms and documents. which are located on the [facility] website. The patient verbally agreed to telehealth consent form, related consents and the risks discussed. Lastly, the patient confirmed that they were in [place]. Included in this visit, time may have been spent reviewing labs, medications, radiology tests and decision making. Appropriate medical decision-making and tests are ordered as detailed in the plan of care above. Coding/billing information is submitted for this visit based on complexity of care and/or time spent for the visit.

## 2023-11-02 ENCOUNTER — TELEPHONE (OUTPATIENT)
Dept: SURGERY | Facility: CLINIC | Age: 33
End: 2023-11-02

## 2023-11-02 DIAGNOSIS — N20.0 RENAL STONES: ICD-10-CM

## 2023-11-02 DIAGNOSIS — Z01.818 PRE-OP TESTING: Primary | ICD-10-CM

## 2023-11-02 NOTE — TELEPHONE ENCOUNTER
Please schedule this patient for surgery.     Thank you!  - urine culture to be done 1 wk prior; order placed    Urology Surgery Request  Surgeon: Carmencita Morocho (if known): EDW  Procedure: Cystoscopy, RIGHT retrograde pyelogram, ureteroscopy, lithotripsy, stone extraction, stent exchange  Anesthesia: General   Time Frame: 11/22/23  Time required: 60 minutes  Diagnosis: renal stones    Antibiotics: per hospital protocol unless checked below   ___ Levaquin 500 mg IV   ___ Gemcitabine 2 g/100 mL NS bladder instillation to be given in OR

## 2023-11-15 ENCOUNTER — LABORATORY ENCOUNTER (OUTPATIENT)
Dept: LAB | Age: 33
End: 2023-11-15
Payer: MEDICAID

## 2023-11-15 PROCEDURE — 87186 SC STD MICRODIL/AGAR DIL: CPT

## 2023-11-15 PROCEDURE — 87086 URINE CULTURE/COLONY COUNT: CPT

## 2023-11-15 PROCEDURE — 87077 CULTURE AEROBIC IDENTIFY: CPT

## 2023-11-21 NOTE — H&P
UROLOGY PRE-OPERATIVE HISTORY & PHYSICAL      Chavez Mcnamara Patient Status:  Hospital Outpatient Surgery    6/15/1990 MRN [id number]   [facility] SURGERY Attending Ramon Kamara MD   Hosp Day # 0 PCP Samy Rubi MD     Primary Care Provider: Christiane Kulkarni MD     Chief Complaint:   Nephrolithiasis    History of Present Illness:   27yF with history of recurrent nephrolithiasis (medullary sponge kidney) with recent CT on showing 6mm and 8mm obstructing R ureteral stones as well as multiple bilateral non-obstructing renal stones. Patient underwent ESWNL and right ureteral stent placement by [facility] urologist Dr. Rebekah Whatley on 10/7/23. Repeat CT 10/30/23 shows persistent bilateral renal stones and stone fragments alongside the stent in the ureter. UCx E coli on 11/15/23 - treated with Bactrim. History:     Past Medical History:   Diagnosis Date    Calculus of kidney     2 years ago    Esophageal reflux     Gonococcal infection (acute) of lower genitourinary tract     Kidney stones     Proteinuria     Renal disorder     Vaginitis and vulvovaginitis, unspecified     Visual impairment     Glasses/Contacts       Past Surgical History:   Procedure Laterality Date    [facility] COSMETIC SURGERY      liposuction    OTHER  2021    BBL cosmetic procedure    OTHER SURGICAL HISTORY  2021    Cysto, Lt RPG, Lt URS, stone removal, Lt stent.  Dr. Daja Ferguson  2021    Cysto/stent removal. Dr. Taryn Schroeder Surgery again 10/4/2023       Family History   Problem Relation Age of Onset    Cancer Paternal Grandmother         Lung     Diabetes Maternal Grandfather     Hypertension Maternal Grandfather        Social History     Socioeconomic History    Marital status: Single   Tobacco Use    Smoking status: Never    Smokeless tobacco: Never   Vaping Use    Vaping Use: Never used   Substance and Sexual Activity    Alcohol use: No     Alcohol/week: 0.0 standard drinks of alcohol     Comment: rarely    Drug use: No    Sexual activity: Yes     Partners: Male   Other Topics Concern    Caffeine Concern No    Exercise No       Medications:  Current Outpatient Medications   Medication Sig Dispense Refill    sulfamethoxazole-trimethoprim DS (BACTRIM DS) 800-160 MG Oral Tab per tablet Take 1 tablet by mouth 2 (two) times daily. 14 tablet 0       Allergies:  No Known Allergies    Review of Systems:   A comprehensive 10-point review of systems was completed. Pertinent positives and negatives are noted in the the HPI. Physical Exam:   Vital Signs:  Height 5' 1\" (1.549 m), weight 185 lb (83.9 kg), last menstrual period 10/18/2023, not currently breastfeeding.      CONSTITUTIONAL: Well developed, well nourished, in no acute distress  NEUROLOGIC: Alert and oriented  HEAD: Normocephalic, atraumatic  EYES: Sclera non-icteric  ENT: Hearing intact, moist mucous membranes  NECK: No obvious goiter or masses  RESPIRATORY: Normal respiratory effort  SKIN: No evident rashes  ABDOMEN: Soft, non-tender, non-distended    Laboratory Data:  Lab Results   Component Value Date    WBC 7.4 10/19/2023    HGB 14.3 10/19/2023    .0 10/19/2023     Lab Results   Component Value Date     10/19/2023    K 4.1 10/19/2023     10/19/2023    CO2 27.0 10/19/2023    BUN 9 10/19/2023    GLU 83 10/19/2023    GFRAA 98 05/24/2022    AST 21 10/19/2023    ALT 36 10/19/2023    TP 7.8 10/19/2023    ALB 3.4 10/19/2023    CA 8.6 10/19/2023    MG 2.1 03/24/2021       Urinalysis Results (last three years):  Recent Labs     03/23/21  0846 07/18/21  0047 08/23/21  1905 02/10/22  1736 05/24/22  1024 08/13/22  1321 08/25/23  0836 08/25/23  1011 10/17/23  0922   COLORUR Yellow Yellow Yellow Yellow Yellow Yellow  --   --   --    CLARITY Cloudy* Hazy* Hazy* Clear Clear Clear  --   --   --    SPECGRAVITY 1.015 1.016 1.013 1.014 1.009 1.020 1.025  --  1.020   PHURINE 6.0 6.0 8.0 6.0 6.0 7.0 6.0  --  7.0   PROUR 100* Negative Negative Negative Negative Negative  --   --   --    GLUUR Negative Negative Negative Negative Negative Negative  --   --   --    RZKFH Trace* Negative Trace* Negative Negative Negative  --   --   --    BILUR Negative Negative Negative Negative Negative Negative  --   --   --    BLOODURINE Moderate* Large* Negative Small* Small* Small*  --   --   --    NITRITE Positive* Negative Negative Negative Negative Negative Negative  --  Negative   UROBILINOGEN <2.0 <2.0 <2.0 <2.0 <2.0 0.2  --   --   --    LEUUR Large* Moderate* Negative Trace* Small* Trace*  --   --   --    WBCUR >50* 11-20*  --  1-5 6-10* 6-10*  6-10*  --  1-5  --    RBCUR >10* 6-10*  --  0-2 3-5* 3-5*  3-5*  --  3-5*  --    BACUR 2+* Rare*  --  None Seen Rare* None Seen  None Seen  --  1+*  --        Urine Culture Results (last three years):  Lab Results   Component Value Date    URINECUL >100,000 CFU/ML Escherichia coli (A) 11/15/2023    URINECUL No Growth 2 Days 08/25/2023    URINECUL No Growth at 18-24 hrs. 08/13/2022    URINECUL No Growth at 18-24 hrs. 05/24/2022    URINECUL  02/10/2022     <10,000 cfu/ml Mixture of Gram positive organisms isolated - probable contamination. URINECUL No Growth at 18-24 hrs. 07/18/2021    URINECUL >100,000 CFU/ML Escherichia coli (A) 03/23/2021    URINECUL >100,000 CFU/ML Escherichia coli (A) 06/23/2020    URINECUL >100,000 CFU/ML Escherichia coli (A) 03/05/2020    URINECUL No Growth at 18-24 hrs. 07/23/2019    URINECUL >100,000 CFU/ML Escherichia coli (A) 07/12/2019    URINECUL  06/21/2019     <10,000 cfu/ml Mixture of Gram positive organisms isolated - probable contamination. URINECUL No Growth at 18-24 hrs. 06/18/2019    URINECUL No Growth 1 Day 07/21/2016    URINECUL No Growth 1 Day 05/11/2016       PSA:  No results found for: \"PSA\", \"PERCENTPSA\", \"PSAS\", \"PSAULTRA\"     Imaging (last three days):  No results found.      Assessment:   33yF with history of recurrent nephrolithiasis (medullary sponge kidney) with recent CT on showing 6mm and 8mm obstructing R ureteral stones as well as multiple bilateral non-obstructing renal stones. Patient underwent ESWNL and right ureteral stent placement by [facility] urologist Dr. Logan Moreau on 10/7/23. Repeat CT 10/30/23 shows persistent bilateral renal stones and stone fragments alongside the stent in the ureter. Plan:   - OR for cystoscopy, RIGHT ureteroscopy, laser lithotripsy, stent exchange  - Informed consent obtained - risks and benefits explained, and all questions answered    I have personally reviewed all relevant medical records, labs, and imaging. Dione Edwards.  Araceli Sheth MD  Staff Urologist  [facility]  Office: [phone]

## 2023-11-22 ENCOUNTER — APPOINTMENT (OUTPATIENT)
Dept: GENERAL RADIOLOGY | Facility: HOSPITAL | Age: 33
End: 2023-11-22
Attending: SURGERY
Payer: MEDICAID

## 2023-11-22 ENCOUNTER — HOSPITAL ENCOUNTER (OUTPATIENT)
Facility: HOSPITAL | Age: 33
Setting detail: HOSPITAL OUTPATIENT SURGERY
Discharge: HOME | End: 2023-11-22
Attending: SURGERY | Admitting: SURGERY
Payer: MEDICAID

## 2023-11-22 ENCOUNTER — ANESTHESIA (OUTPATIENT)
Dept: SURGERY | Facility: HOSPITAL | Age: 33
End: 2023-11-22
Payer: MEDICAID

## 2023-11-22 ENCOUNTER — ANESTHESIA EVENT (OUTPATIENT)
Dept: SURGERY | Facility: HOSPITAL | Age: 33
End: 2023-11-22
Payer: MEDICAID

## 2023-11-22 VITALS
RESPIRATION RATE: 16 BRPM | BODY MASS INDEX: 34.08 KG/M2 | OXYGEN SATURATION: 97 % | WEIGHT: 180.5 LBS | TEMPERATURE: 98 F | HEART RATE: 100 BPM | DIASTOLIC BLOOD PRESSURE: 67 MMHG | HEIGHT: 61 IN | SYSTOLIC BLOOD PRESSURE: 107 MMHG

## 2023-11-22 DIAGNOSIS — N20.0 RENAL STONES: Primary | ICD-10-CM

## 2023-11-22 LAB — B-HCG UR QL: NEGATIVE

## 2023-11-22 PROCEDURE — 52356 CYSTO/URETERO W/LITHOTRIPSY: CPT | Performed by: SURGERY

## 2023-11-22 PROCEDURE — 0T768DZ DILATION OF RIGHT URETER WITH INTRALUMINAL DEVICE, VIA NATURAL OR ARTIFICIAL OPENING ENDOSCOPIC: ICD-10-PCS | Performed by: SURGERY

## 2023-11-22 PROCEDURE — 0TC68ZZ EXTIRPATION OF MATTER FROM RIGHT URETER, VIA NATURAL OR ARTIFICIAL OPENING ENDOSCOPIC: ICD-10-PCS | Performed by: SURGERY

## 2023-11-22 PROCEDURE — 52353 CYSTOURETERO W/LITHOTRIPSY: CPT | Performed by: SURGERY

## 2023-11-22 PROCEDURE — 0TP98DZ REMOVAL OF INTRALUMINAL DEVICE FROM URETER, VIA NATURAL OR ARTIFICIAL OPENING ENDOSCOPIC: ICD-10-PCS | Performed by: SURGERY

## 2023-11-22 DEVICE — URETERAL STENT
Type: IMPLANTABLE DEVICE | Site: URETER | Status: FUNCTIONAL
Brand: ASCERTA™

## 2023-11-22 RX ORDER — EPHEDRINE SULFATE 50 MG/ML
INJECTION INTRAVENOUS AS NEEDED
Status: DISCONTINUED | OUTPATIENT
Start: 2023-11-22 | End: 2023-11-22 | Stop reason: SURG

## 2023-11-22 RX ORDER — PHENYLEPHRINE HCL 10 MG/ML
VIAL (ML) INJECTION AS NEEDED
Status: DISCONTINUED | OUTPATIENT
Start: 2023-11-22 | End: 2023-11-22 | Stop reason: SURG

## 2023-11-22 RX ORDER — SODIUM CHLORIDE, SODIUM LACTATE, POTASSIUM CHLORIDE, CALCIUM CHLORIDE 600; 310; 30; 20 MG/100ML; MG/100ML; MG/100ML; MG/100ML
INJECTION, SOLUTION INTRAVENOUS CONTINUOUS
Status: DISCONTINUED | OUTPATIENT
Start: 2023-11-22 | End: 2023-11-22

## 2023-11-22 RX ORDER — ONDANSETRON 2 MG/ML
INJECTION INTRAMUSCULAR; INTRAVENOUS AS NEEDED
Status: DISCONTINUED | OUTPATIENT
Start: 2023-11-22 | End: 2023-11-22 | Stop reason: SURG

## 2023-11-22 RX ORDER — LIDOCAINE HYDROCHLORIDE 10 MG/ML
INJECTION, SOLUTION EPIDURAL; INFILTRATION; INTRACAUDAL; PERINEURAL AS NEEDED
Status: DISCONTINUED | OUTPATIENT
Start: 2023-11-22 | End: 2023-11-22 | Stop reason: SURG

## 2023-11-22 RX ORDER — ACETAMINOPHEN 500 MG
1000 TABLET ORAL ONCE AS NEEDED
Status: DISCONTINUED | OUTPATIENT
Start: 2023-11-22 | End: 2023-11-22

## 2023-11-22 RX ORDER — HYDROMORPHONE HYDROCHLORIDE 1 MG/ML
0.6 INJECTION, SOLUTION INTRAMUSCULAR; INTRAVENOUS; SUBCUTANEOUS EVERY 5 MIN PRN
Status: DISCONTINUED | OUTPATIENT
Start: 2023-11-22 | End: 2023-11-22

## 2023-11-22 RX ORDER — SCOLOPAMINE TRANSDERMAL SYSTEM 1 MG/1
1 PATCH, EXTENDED RELEASE TRANSDERMAL ONCE
Status: DISCONTINUED | OUTPATIENT
Start: 2023-11-22 | End: 2023-11-22 | Stop reason: HOSPADM

## 2023-11-22 RX ORDER — OXYBUTYNIN CHLORIDE 5 MG/1
5 TABLET ORAL 3 TIMES DAILY PRN
Qty: 15 TABLET | Refills: 0 | Status: SHIPPED | OUTPATIENT
Start: 2023-11-22

## 2023-11-22 RX ORDER — HYDROCODONE BITARTRATE AND ACETAMINOPHEN 5; 325 MG/1; MG/1
1 TABLET ORAL ONCE
Status: COMPLETED | OUTPATIENT
Start: 2023-11-22 | End: 2023-11-22

## 2023-11-22 RX ORDER — CEFAZOLIN SODIUM/WATER 2 G/20 ML
2 SYRINGE (ML) INTRAVENOUS ONCE
Status: COMPLETED | OUTPATIENT
Start: 2023-11-22 | End: 2023-11-22

## 2023-11-22 RX ORDER — HYDROMORPHONE HYDROCHLORIDE 1 MG/ML
0.4 INJECTION, SOLUTION INTRAMUSCULAR; INTRAVENOUS; SUBCUTANEOUS EVERY 5 MIN PRN
Status: DISCONTINUED | OUTPATIENT
Start: 2023-11-22 | End: 2023-11-22

## 2023-11-22 RX ORDER — NALOXONE HYDROCHLORIDE 0.4 MG/ML
0.08 INJECTION, SOLUTION INTRAMUSCULAR; INTRAVENOUS; SUBCUTANEOUS AS NEEDED
Status: DISCONTINUED | OUTPATIENT
Start: 2023-11-22 | End: 2023-11-22

## 2023-11-22 RX ORDER — KETOROLAC TROMETHAMINE 10 MG/1
10 TABLET, FILM COATED ORAL EVERY 8 HOURS PRN
Qty: 10 TABLET | Refills: 0 | Status: SHIPPED | OUTPATIENT
Start: 2023-11-22

## 2023-11-22 RX ORDER — MIDAZOLAM HYDROCHLORIDE 1 MG/ML
1 INJECTION INTRAMUSCULAR; INTRAVENOUS EVERY 5 MIN PRN
Status: DISCONTINUED | OUTPATIENT
Start: 2023-11-22 | End: 2023-11-22

## 2023-11-22 RX ORDER — HYDROCODONE BITARTRATE AND ACETAMINOPHEN 5; 325 MG/1; MG/1
TABLET ORAL
Status: COMPLETED
Start: 2023-11-22 | End: 2023-11-22

## 2023-11-22 RX ORDER — PHENAZOPYRIDINE HYDROCHLORIDE 100 MG/1
100 TABLET, FILM COATED ORAL 3 TIMES DAILY PRN
Qty: 10 TABLET | Refills: 0 | Status: SHIPPED | OUTPATIENT
Start: 2023-11-22

## 2023-11-22 RX ORDER — HYDROMORPHONE HYDROCHLORIDE 1 MG/ML
0.2 INJECTION, SOLUTION INTRAMUSCULAR; INTRAVENOUS; SUBCUTANEOUS EVERY 5 MIN PRN
Status: DISCONTINUED | OUTPATIENT
Start: 2023-11-22 | End: 2023-11-22

## 2023-11-22 RX ORDER — DEXAMETHASONE SODIUM PHOSPHATE 4 MG/ML
VIAL (ML) INJECTION AS NEEDED
Status: DISCONTINUED | OUTPATIENT
Start: 2023-11-22 | End: 2023-11-22 | Stop reason: SURG

## 2023-11-22 RX ORDER — ACETAMINOPHEN 500 MG
1000 TABLET ORAL ONCE
Status: DISCONTINUED | OUTPATIENT
Start: 2023-11-22 | End: 2023-11-22 | Stop reason: HOSPADM

## 2023-11-22 RX ORDER — ONDANSETRON 2 MG/ML
INJECTION INTRAMUSCULAR; INTRAVENOUS
Status: COMPLETED
Start: 2023-11-22 | End: 2023-11-22

## 2023-11-22 RX ORDER — ONDANSETRON 2 MG/ML
4 INJECTION INTRAMUSCULAR; INTRAVENOUS EVERY 6 HOURS PRN
Status: DISCONTINUED | OUTPATIENT
Start: 2023-11-22 | End: 2023-11-22

## 2023-11-22 RX ADMIN — EPHEDRINE SULFATE 10 MG: 50 INJECTION INTRAVENOUS at 07:43:00

## 2023-11-22 RX ADMIN — DEXAMETHASONE SODIUM PHOSPHATE 8 MG: 4 MG/ML VIAL (ML) INJECTION at 07:13:00

## 2023-11-22 RX ADMIN — PHENYLEPHRINE HCL 100 MCG: 10 MG/ML VIAL (ML) INJECTION at 07:37:00

## 2023-11-22 RX ADMIN — PHENYLEPHRINE HCL 100 MCG: 10 MG/ML VIAL (ML) INJECTION at 07:31:00

## 2023-11-22 RX ADMIN — ONDANSETRON 4 MG: 2 INJECTION INTRAMUSCULAR; INTRAVENOUS at 07:13:00

## 2023-11-22 RX ADMIN — SODIUM CHLORIDE, SODIUM LACTATE, POTASSIUM CHLORIDE, CALCIUM CHLORIDE: 600; 310; 30; 20 INJECTION, SOLUTION INTRAVENOUS at 08:21:00

## 2023-11-22 RX ADMIN — LIDOCAINE HYDROCHLORIDE 50 MG: 10 INJECTION, SOLUTION EPIDURAL; INFILTRATION; INTRACAUDAL; PERINEURAL at 07:09:00

## 2023-11-22 RX ADMIN — SODIUM CHLORIDE, SODIUM LACTATE, POTASSIUM CHLORIDE, CALCIUM CHLORIDE: 600; 310; 30; 20 INJECTION, SOLUTION INTRAVENOUS at 07:13:00

## 2023-11-22 RX ADMIN — PHENYLEPHRINE HCL 100 MCG: 10 MG/ML VIAL (ML) INJECTION at 07:17:00

## 2023-11-22 RX ADMIN — PHENYLEPHRINE HCL 100 MCG: 10 MG/ML VIAL (ML) INJECTION at 07:14:00

## 2023-11-22 RX ADMIN — EPHEDRINE SULFATE 10 MG: 50 INJECTION INTRAVENOUS at 07:55:00

## 2023-11-22 RX ADMIN — PHENYLEPHRINE HCL 100 MCG: 10 MG/ML VIAL (ML) INJECTION at 07:55:00

## 2023-11-22 RX ADMIN — PHENYLEPHRINE HCL 100 MCG: 10 MG/ML VIAL (ML) INJECTION at 07:43:00

## 2023-11-22 RX ADMIN — CEFAZOLIN SODIUM/WATER 2 G: 2 G/20 ML SYRINGE (ML) INTRAVENOUS at 07:10:00

## 2023-11-22 NOTE — ANESTHESIA PROCEDURE NOTES
Airway  Date/Time: 11/22/2023 7:09 AM  Urgency: elective    Airway not difficult    General Information and Staff    Patient location during procedure: OR  Anesthesiologist: Rowdy Akbar MD  Performed: anesthesiologist   Performed by: Rowdy Akbar MD  Authorized by: Rowdy Akbar MD      Indications and Patient Condition  Indications for airway management: anesthesia  Spontaneous Ventilation: absent  Sedation level: deep  Preoxygenated: yes  Patient position: sniffing  Mask difficulty assessment: 1 - vent by mask    Final Airway Details  Final airway type: supraglottic airway      Successful airway: classic  Size 3       Number of attempts at approach: 1  Ventilation between attempts: none  Number of other approaches attempted: 0

## 2023-11-22 NOTE — OPERATIVE REPORT
Urology Operative Note    Attending Surgeon: Theo Cotter MD    Assistant Surgeon: None    Patient Name: Chavez Mcnamara    Date of Surgery: 11/22/2023    Preoperative Diagnosis: Right nephrolithiasis    Postoperative Diagnosis: Same    Procedure Performed:   1) Cystoscopy, RIGHT ureteroscopy (ureter), laser lithotripsy, basket stone extraction, retrograde pyelogram, ureteral stent exchange    2) Cystoscopy, RIGHT ureteroscopy (kidney), laser lithotripsy, basket stone extraction, retrograde pyelogram, stent exchange    Indication:  Patient is a 29year old female who presented with a residual right ureteral and kidney stones after ESWL and stent placement. The patient was counseled on options, risks, and benefits and elected to undergo the above procedure. We discussed risks including, but not limited to, bleeding, infection, damage to surrounding structures, need for repeat procedure(s). The patient understood these risks and wished to proceed with surgery. Findings:  Cystoscopy - normal urethra without strictures, normal bladder. Single stone in ureter (~6-7mm) and multiple free-floating stones in kidney fully treated. Significant Jeronimo's plaques and intra-parenchymal calcifications in all calyces consistent with medullary sponge kidney. Procedure: The patient was taken to the operating room and a timeout was performed confirming the correct patient and procedure. The patient was prepped and draped in lithotomy position after undergoing general anesthesia. Pre-operative prophylactic antibiotics were given in the form of Cefazolin. The cystoscope was inserted per urethra and the bladder was inspected and drained. The right ureteral stent was grasped and pulled to the urethral meatus with a cystoscopic grasper. The stent was cannulated with a Sensor wire, and the wire was passed into the renal pelvis which I confirmed using fluoroscopy. The stent was fully removed.     Then, I inserted a semirigid ureteroscope alongside the wire and into the ureter. A stone was seen in the ureter. The stone was fragmented using a laser, and then the fragments were extracted using a zero tip basket. The stone fragments were dropped in the bladder and later removed through the cystoscope. I inserted a second Sensor wire through the scope into the renal pelvis, and confirmed fluoroscopically. The scope was removed. A 12/14 Citizen of [place] ureteral access sheath was inserted over the first wire. It was advanced without resistance to the mid ureter. The first wire and inner cannula of the access sheath were removed, leaving the safety wire in place alongside the access sheath. The flexible ureteroscope was advanced into the sheath and up into the renal pelvis. Multiple free floating stones were seen throughout the kidney. These were fragmented using a laser, and then the fragments were extracted using a zero tip basket. Any smaller remaining stone fragments were lasered to dust, and thoroughly irrigated. All renal calyces were surveyed once more, and no fragments were seen. A retrograde pyelogram was performed through the scope and showed no extravasation. The ureter was inspected while slowly removing the scope and access sheath, and it appeared healthy without stone fragments seen. A 6-Citizen of [place] x 24 cm JJ ureteral stent was inserted over the remaining wire. The proximal coil was formed in the kidney under fluoroscopic guidance. The distal coil was formed within the bladder under direct cystoscopic visualization. The stent string was removed prior to stent placement. The bladder was drained and the cystoscope was removed. The patient was awoken from anesthesia and transferred to PACU in stable condition. The patient tolerated the procedure well. All instrument/supply counts were correct at the end of the case.     Specimens:   Stone fragments for chemical analysis    Estimated Blood Loss:  1 mL    Tubes/Drains:  6-Citizen of [place] x 24 cm JJ right ureteral stent    Complications:   None immediate    Condition from OR:  Stable    Plan:   The patient will follow up in the office for stent removal in 1 week. Ada Dudley.  Alton Toussaint MD  Staff Urologist  [place] [address]  Office: [phone]

## 2023-11-22 NOTE — DISCHARGE INSTRUCTIONS
You had cystoscopy, ureteroscopy, and stent placement in the operating room today. Instructions:    - No heavy lifting or strenuous activity for 1 day. You may resume regular activity tomorrow. - The office will contact you to make your post-operative clinic appointment in 1 week for stent removal. If you do not hear from the clinic after a few days or you need to change your appointment time or date, please contact us at [phone]. - You have a stent (small plastic tube) inside your kidney and ureter to allow the swelling from surgery to resolve. This is only temporary and must be removed, so you should not forget about it. We will remove your stent via a brief cystoscopy in clinic when you return. If you have to miss this appointment for some reason please make sure you re-schedule it. - With a ureteral stent in place you may have some discomfort on your side/flank. You can feel pain worsen when you urinate, so try to avoid holding urine and void every 2-3 hours. You also may notice increased blood in the urine as you increase your activity. If this happens increase your hydration and take it easy for a day. Warm baths/hot packs can help with the discomfort as well as your prescribed medications and Advil/Motrin (if you are able to take these). - You may experience mild pain after the procedure for a few days. If the pain becomes intolerable please contact our office or go to the nearest Emergency Room or Urgent Care. You should take over the counter ibuprofen (AKA motrin, advil) for mild pain (provided you do not have a medical condition such as stomach ulcers or kidney disease which prohibits you from taking these). You may alternate this with tylenol as well. If pain is still not relieved by tylenol and/or ibuprofen, you may take narcotic pain medication if prescribed (typically oxycodone or tramadol).  If you are taking narcotic pain medication this can make you constipated, so you should take over the counter stool softeners or miralax if prescribed. - Warm pack or hot baths often help with discomfort after cystoscopy. - If you take blood thinners (such as aspirin or plavix) please hold these medications until 3 days after surgery.     - You may experience burning and frequency of urination over the next few days. This will improve after a few days if you stay well hydrated. If you were prescribed phenazopyridine (Pyridium) this may relieve urinary discomfort but you can only take this for 3 days. Pyridium will make your urine orange. - You are likely to see some blood in your urine (pink or light red urine) that should clear up within a few days. Staying well hydrated should help this clear up. If you notice the urine stays dark red or there are multiple large blood clots despite good hydration, please call the urology clinic ([phone]). - Try to abstain from alcohol, coffee, tea, artificial sweeteners, and spicy food for the next 48 hours as these can irritate the bladder.     - If you develop fevers / chills, difficulty urinating, or abdominal pain that does not improve with pain medications, please call the office.     - Drink 1.5 to 2 liters of fluid today (water is preferable). If you are on a fluid restriction due to other medical reasons then you need to adhere to your fluid restriction recommendations. Anais Soliz.  Kvng Baldwin MD  Staff Urologist  [place] [address]  Office: [phone]

## 2023-11-24 ENCOUNTER — TELEPHONE (OUTPATIENT)
Dept: SURGERY | Facility: CLINIC | Age: 33
End: 2023-11-24

## 2023-11-24 RX ORDER — ONDANSETRON 4 MG/1
4 TABLET, FILM COATED ORAL EVERY 8 HOURS PRN
Qty: 15 TABLET | Refills: 0 | Status: SHIPPED | OUTPATIENT
Start: 2023-11-24

## 2023-11-24 NOTE — TELEPHONE ENCOUNTER
Patient indicates the pain mediation she was given makes her nauseated. Patient request script for rx Iburforin, etc in place of medication given. Patient also asking for a medication to help with nausea, indicates she can not keep anything down.   If script is sent please send to    [address], [facility] Kristi Wisdom AT [facility] OF [address], [phone], [phone] [[id number]]

## 2023-12-01 ENCOUNTER — PROCEDURE (OUTPATIENT)
Dept: SURGERY | Facility: CLINIC | Age: 33
End: 2023-12-01
Payer: MEDICAID

## 2023-12-01 DIAGNOSIS — N20.0 NEPHROLITHIASIS: Primary | ICD-10-CM

## 2023-12-01 LAB
APPEARANCE: CLEAR
BILIRUBIN: NEGATIVE
GLUCOSE (URINE DIPSTICK): NEGATIVE MG/DL
KETONES (URINE DIPSTICK): NEGATIVE MG/DL
MULTISTIX LOT#: ABNORMAL NUMERIC
NITRITE, URINE: NEGATIVE
PH, URINE: 6.5 (ref 4.5–8)
PROTEIN (URINE DIPSTICK): 100 MG/DL
SPECIFIC GRAVITY: 1.02 (ref 1–1.03)
URINE-COLOR: YELLOW
UROBILINOGEN,SEMI-QN: 0.2 MG/DL (ref 0–1.9)

## 2023-12-01 PROCEDURE — 81002 URINALYSIS NONAUTO W/O SCOPE: CPT | Performed by: SURGERY

## 2023-12-01 PROCEDURE — 52310 CYSTOSCOPY AND TREATMENT: CPT | Performed by: SURGERY

## 2023-12-01 RX ORDER — CIPROFLOXACIN 500 MG/1
500 TABLET, FILM COATED ORAL ONCE
Status: COMPLETED | OUTPATIENT
Start: 2023-12-01 | End: 2023-12-01

## 2023-12-01 RX ADMIN — CIPROFLOXACIN 500 MG: 500 TABLET, FILM COATED ORAL at 10:18:00

## 2023-12-01 NOTE — PROCEDURES
Clinic Procedure Note    INDICATIONS:   33yF with history of recurrent nephrolithiasis (medullary sponge kidney) with recent CT on showing 6mm and 8mm obstructing R ureteral stones as well as multiple bilateral non-obstructing renal stones. Patient underwent ESWNL and right ureteral stent placement by [facility] urologist Dr. Wenceslao Verma on 10/7/23. Repeat CT 10/30/23 shows persistent bilateral renal stones and stone fragments alongside the stent in the ureter. I brought her to the OR on 2023 for right ureteroscopy, laser lithotripsy, stent exchange. Stones in the ureter and kidney were fully treated. She had significant Jeronimo's plaques and intraparenchymal calcifications in all calyces consistent with medullary sponge kidney. PROCEDURE:       1. Flexible cystoscopy, removal of right ureteral stent (62640)    DATE OF PROCEDURE: 2023     PRE-PROCEDURE DIAGNOSIS: Nephrolithiasis    POST-PROCEDURE DIAGNOSIS: Same     SURGEON: Sarah Gonzalez MD    FINDINGS:  Stent successfully removed in its entirety. PROCEDURE:   Patient was brought to the procedure suite and a time-out was performed identifiying the patient,  and procedure to be performed. The risks and benefits of the procedure were once again discussed with the patient including bleeding, infection, and dysuria. The patient agreed to proceed. The patient did not have any signs or symptoms of active UTI. Prophylactic PO antibiotics were given in clinic. The patient was placed in supine position on the table and the genital area was prepped and draped in the standard sterile fashion. Vola Bryant was used prior for local anesthesia effect. A flexible cystoscope was inserted per urethra. There were no urethral strictures present. The bladder was entered and the distal coil of the stent was seen protruding from the ureteral orifice. The stent was grasped with the flexible stent grasper, and then the stent and cystoscope were both removed.  The stent was visualized outside the body and confirmed to be intact and fully removed. There were no complications and the patient tolerated the procedure well. IMPRESSION:  Successful stent removal after ureteroscopy today. PLAN:   -Renal/bladder ultrasound in 6 weeks to ensure no silent hydronephrosis  -24 hour urine study for metabolic workup    Guanaco Montero MD  Staff Urologist  [facility]  Office: [phone]

## 2023-12-02 LAB
CAOX DIHYDRATE: 50 %
CAOX MONOHYDRATE: 45 %
HYDROXYAPATITE: 5 %
WEIGHT-STONE: 59 MG

## 2023-12-04 NOTE — PROGRESS NOTES
Thompson Carrera,    I have reviewed your stone analysis results. Your kidney stone was made of calcium, which is the most common type of stone. We can review further stone prevention strategies at your next appointment, but in the meantime you should stay well hydrated and limit salt and animal protein intake. Please let me know if you have any questions.     Thanks,  Shaun Davis MD

## (undated) DEVICE — URETEROSCOPE MED STD FLX DGT W/ EMPOWER BNDL

## (undated) DEVICE — STERILE SYNTHETIC NEOPRENE POWDER-FREE SURGICAL GLOVES WITH NITRILE COATING, SMOOTH FINISH, STRAIGHT FINGER: Brand: PROTEXIS

## (undated) DEVICE — NITINOL STONE RETRIEVAL BASKET: Brand: ZERO TIP

## (undated) DEVICE — SOL  .9 3000ML

## (undated) DEVICE — Device

## (undated) DEVICE — TIGERTAIL 5F FLXTIP 70CM

## (undated) DEVICE — CYSTO CDS-LF: Brand: MEDLINE INDUSTRIES, INC.

## (undated) DEVICE — ZIPWIRE GUIDEWIRE .035X150 STR

## (undated) DEVICE — ADHESIVE MASTISOL 2/3CC VL

## (undated) DEVICE — STERILE POLYISOPRENE POWDER-FREE SURGICAL GLOVES: Brand: PROTEXIS

## (undated) DEVICE — SYSTEM PUMPING SINGLE ACTION

## (undated) DEVICE — SEAL BX PRT Y ADPT ADJ

## (undated) DEVICE — SENS GUIW URO 150CM NIT SS

## (undated) DEVICE — SCD SLEEVE KNEE HI BLEND

## (undated) DEVICE — 1.9FR NITINOL TIPLESS BSKT

## (undated) DEVICE — SLEEVE COMPR M KNEE LEN SGL USE KENDALL SCD

## (undated) DEVICE — SOLUTION IRRIG 3000ML 0.9% NACL FLX CONT

## (undated) DEVICE — SYRINGE MED 20ML STD CLR PLAS LL TIP N CTRL

## (undated) DEVICE — FIBER LSR 200UM 2J 80HZ 60W DL FOR LITHO

## (undated) DEVICE — SYRINGE 20CC LL TIP

## (undated) DEVICE — ZIPWIRE GUIDEWIRE .038X150 ANG

## (undated) NOTE — ED AVS SNAPSHOT
Francoise Haja   MRN: [id number]    Department:  [facility] Emergency Department   Date of Visit:  6/21/2019           Disclosure     Insurance plans vary and the physician(s) referred by the ER may not be covered by your plan.  Please contact yo tell this physician (or your personal doctor if your instructions are to return to your personal doctor) about any new or lasting problems. The primary care or specialist physician will see patients referred from the [facility] Emergency Department.  Matthew Mccord

## (undated) NOTE — LETTER
Dev Prince was seen and treated in our emergency department on 3/23/2021. She may return to work on 4/1/2021      If you have any questions or concerns, please don't hesitate to call.       Mar Alexandre MD

## (undated) NOTE — LETTER
Date & Time: 11/14/2022, 11:02 AM  Patient: Tonja Villatoro  Encounter Provider(s):    Telma Osorio MD       To Whom It May Concern:    Dolly Lopez was seen and treated in our department on 11/14/2022. She should not return to work until November 17, 2022.     If you have any questions or concerns, please do not hesitate to call.        _____________________________  Physician/APC Signature

## (undated) NOTE — LETTER
Date & Time: 6/23/2020, 1:38 PM  Patient: Bailee Belcher  Encounter Provider(s):    Orvan Boxer, MD       To Whom It May Concern:    Leafy Osler was seen and treated in our department on 6/23/2020.  .    If you have any questions or concerns, ple

## (undated) NOTE — LETTER
Date & Time: 6/21/2019, 10:58 AM  Patient: Alisha Tomlin  Encounter Provider(s):    Silver Vasquez MD       To Whom It May Concern:    Kimi Staples was seen and treated in our department on 6/21/2019. She should not return to work until 6/24/19.

## (undated) NOTE — ED AVS SNAPSHOT
Jazzy Hughes   MRN: [id number]    Department:  [facility] Emergency Department   Date of Visit:  11/17/2019           Disclosure     Insurance plans vary and the physician(s) referred by the ER may not be covered by your plan.  Please contact y tell this physician (or your personal doctor if your instructions are to return to your personal doctor) about any new or lasting problems. The primary care or specialist physician will see patients referred from the [facility] Emergency Department.  Flako Ledesma

## (undated) NOTE — ED AVS SNAPSHOT
Jaycob Diaz   MRN: [id number]    Department:  [facility] Emergency Department   Date of Visit:  3/5/2020           Disclosure     Insurance plans vary and the physician(s) referred by the ER may not be covered by your plan.  Please contact you tell this physician (or your personal doctor if your instructions are to return to your personal doctor) about any new or lasting problems. The primary care or specialist physician will see patients referred from the [facility] Emergency Department.  Kel Regan

## (undated) NOTE — LETTER
Date & Time: 11/17/2019, 10:44 AM  Patient: Bailee Belchre  Encounter Provider(s):    Bakari Pennington MD       To Whom It May Concern:    Leafy Osler was seen and treated in our department on 11/17/2019.  She should not return to work until Kimi James

## (undated) NOTE — LETTER
Date & Time: 7/23/2019, 12:17 PM  Patient: Sonam Able  Encounter Provider(s):    Michael Polanco MD       To Whom It May Concern:    Sam Serrato was seen and treated in our department on 7/23/2019. She can return to work.     If you have any ques

## (undated) NOTE — LETTER
Date & Time: 6/18/2019, 11:56 AM  Patient: David Glez  Encounter Provider(s):    Ashok Cardenas MD       To Whom It May Concern:    Tara Shaikh was seen and treated in our department on 6/18/2019.  She should not return to work until 6/20/19

## (undated) NOTE — MR AVS SNAPSHOT
After Visit Summary   11/22/2019    Angel Lyman    MRN: [id number]           Visit Information     Date & Time  11/22/2019 10:30 AM Provider  Pinky Miranda PA-C [place] [address], [place].  Phone  [phone] 1. In your Internet browser, go to [email or web address]  2. Click on the Activate your Account if you have an activation code in the box under the *New User? section. 3. Enter your [employer] Activation Code exactly as it appears below.  You will help us do so. For more information on [employer], please visit [email or web address]                   DO YOU KNOW WHERE TO GO? Injury & illness are never convenient.  If you are dealing with a   non-emergency, consider your o

## (undated) NOTE — ED AVS SNAPSHOT
Otilio Brock   MRN: [id number]    Department:  [facility] Emergency Department   Date of Visit:  8/10/2017           Disclosure     Insurance plans vary and the physician(s) referred by the ER may not be covered by your plan.  Please contact yo If you have been prescribed any medication(s), please fill your prescription right away and begin taking the medication(s) as directed    If the emergency physician has read X-rays, these will be re-interpreted by a radiologist.  If there is a significant

## (undated) NOTE — LETTER
Date & Time: 5/24/2022, 11:49 AM  Patient: Siri Christensen  Encounter Provider(s):    Britta Carson MD       To Whom It May Concern:    Wnedy Jason was seen and treated in our department on 5/24/2022. She may return to work on May 26,2022.     If you have any questions or concerns, please do not hesitate to call.        _____________________________  Physician/APC Signature

## (undated) NOTE — LETTER
08/25/21    [facility]      To Whom It May Concern: The above patient was seen at [facility] for treatment of a medical condition from 8/23/2021-8/25/2021. The patient may return to work on 8/26/2021 without any limitations.        Kimberly Esteves

## (undated) NOTE — LETTER
Date & Time: 10/17/2018, 9:43 PM  Patient: Traci Petersen  Encounter Provider(s):    Toni Schilder, MD Joleen Gip, APN       To Whom It May Concern:    Leo Yee was seen and treated in our department on 10/17/2018. She can return to work.

## (undated) NOTE — ED AVS SNAPSHOT
Radha Wynnreuben   MRN: [id number]    Department:  [facility] Emergency Department   Date of Visit:  4/28/2018           Disclosure     Insurance plans vary and the physician(s) referred by the ER may not be covered by your plan.  Please contact yo tell this physician (or your personal doctor if your instructions are to return to your personal doctor) about any new or lasting problems. The primary care or specialist physician will see patients referred from the [facility] Emergency Department.  Dimitris Conti

## (undated) NOTE — ED AVS SNAPSHOT
Chanla Lloyd   MRN: [id number]    Department:  [facility] Emergency Department   Date of Visit:  7/23/2019           Disclosure     Insurance plans vary and the physician(s) referred by the ER may not be covered by your plan.  Please contact yo tell this physician (or your personal doctor if your instructions are to return to your personal doctor) about any new or lasting problems. The primary care or specialist physician will see patients referred from the [facility] Emergency Department.  Kevin Hassan

## (undated) NOTE — ED AVS SNAPSHOT
Alisha Tomlin   MRN: [id number]    Department:  [facility] Emergency Department   Date of Visit:  10/17/2018           Disclosure     Insurance plans vary and the physician(s) referred by the ER may not be covered by your plan.  Please contact y tell this physician (or your personal doctor if your instructions are to return to your personal doctor) about any new or lasting problems. The primary care or specialist physician will see patients referred from the [facility] Emergency Department.  Lima Herman

## (undated) NOTE — LETTER
Anamika Maza [address]  [address] [facility]  Authorization for Surgical Operation and Procedure     Date:___________                                                                                                         Time:__________ transfusion and/or blood products.   The following are some, but not all, of the potential risks that can occur: fever and allergic reactions, hemolytic reactions, transmission of diseases such as Hepatitis, AIDS and Cytomegalovirus (CMV) and fluid overload person authorized to consent on my behalf). The surgeon or my attending physician will determine when the applicable recovery period ends for purposes of reinstating the DNAR order.   10. Patients having a sterilization procedure: I understand that if the p As the patient asking for anesthesia services, I agree to:  a. Allow the anesthesiologist (anesthesia doctor) to give me medicine and do additional procedures as necessary.  Some examples are: Starting or using an “IV” to give me medicine, fluids or blood d heart rhythms and nerve injury. 7. Regional Anesthesia (“spinal”, “epidural”, & “nerve blocks”): I understand that rare but potential complications include headache, bleeding, infection, seizure, irregular heart rhythms, and nerve injury.     I can change

## (undated) NOTE — ED AVS SNAPSHOT
Flaquito Madrid   MRN: [id number]    Department:  [facility] Emergency Department   Date of Visit:  6/18/2019           Disclosure     Insurance plans vary and the physician(s) referred by the ER may not be covered by your plan.  Please contact yo tell this physician (or your personal doctor if your instructions are to return to your personal doctor) about any new or lasting problems. The primary care or specialist physician will see patients referred from the [facility] Emergency Department.  Peggy Penny

## (undated) NOTE — ED AVS SNAPSHOT
Mann Salomon   MRN: [id number]    Department:  [facility] Emergency Department   Date of Visit:  3/24/2019           Disclosure     Insurance plans vary and the physician(s) referred by the ER may not be covered by your plan.  Please contact yo tell this physician (or your personal doctor if your instructions are to return to your personal doctor) about any new or lasting problems. The primary care or specialist physician will see patients referred from the [facility] Emergency Department.  Matthew Mccord

## (undated) NOTE — LETTER
Anamika Maza [address]  [address] [facility]  Authorization for Surgical Operation and Procedure     Date:___________                                                                                                         Time:__________ and/or blood products. The following are some, but not all, of the potential risks that can occur: fever and allergic reactions, hemolytic reactions, transmission of diseases such as Hepatitis, AIDS and Cytomegalovirus (CMV) and fluid overload.   In the ev authorized to consent on my behalf). The surgeon or my attending physician will determine when the applicable recovery period ends for purposes of reinstating the DNAR order.   10. Patients having a sterilization procedure: I understand that if the procedur patient asking for anesthesia services, I agree to:  a. Allow the anesthesiologist (anesthesia doctor) to give me medicine and do additional procedures as necessary.  Some examples are: Starting or using an “IV” to give me medicine, fluids or blood during m rhythms and nerve injury. 7. Regional Anesthesia (“spinal”, “epidural”, & “nerve blocks”): I understand that rare but potential complications include headache, bleeding, infection, seizure, irregular heart rhythms, and nerve injury.     I can change my mi

## (undated) NOTE — ED AVS SNAPSHOT
Damon Art   MRN: [id number]    Department:  [facility] Emergency Department   Date of Visit:  7/12/2019           Disclosure     Insurance plans vary and the physician(s) referred by the ER may not be covered by your plan.  Please contact yo tell this physician (or your personal doctor if your instructions are to return to your personal doctor) about any new or lasting problems. The primary care or specialist physician will see patients referred from the [facility] Emergency Department.  Rosalia Girard